# Patient Record
Sex: FEMALE | Race: WHITE | NOT HISPANIC OR LATINO | Employment: OTHER | URBAN - METROPOLITAN AREA
[De-identification: names, ages, dates, MRNs, and addresses within clinical notes are randomized per-mention and may not be internally consistent; named-entity substitution may affect disease eponyms.]

---

## 2019-09-11 ENCOUNTER — OFFICE VISIT (OUTPATIENT)
Dept: FAMILY MEDICINE | Facility: CLINIC | Age: 59
End: 2019-09-11
Payer: COMMERCIAL

## 2019-09-11 VITALS
SYSTOLIC BLOOD PRESSURE: 148 MMHG | DIASTOLIC BLOOD PRESSURE: 82 MMHG | WEIGHT: 226 LBS | HEIGHT: 62 IN | BODY MASS INDEX: 41.59 KG/M2 | HEART RATE: 84 BPM

## 2019-09-11 DIAGNOSIS — M54.12 CERVICAL RADICULOPATHY: Primary | ICD-10-CM

## 2019-09-11 DIAGNOSIS — E66.9 OBESITY (BMI 30-39.9): ICD-10-CM

## 2019-09-11 PROCEDURE — 99214 PR OFFICE/OUTPT VISIT, EST, LEVL IV, 30-39 MIN: ICD-10-PCS | Mod: S$GLB,,, | Performed by: PHYSICIAN ASSISTANT

## 2019-09-11 PROCEDURE — 99214 OFFICE O/P EST MOD 30 MIN: CPT | Mod: S$GLB,,, | Performed by: PHYSICIAN ASSISTANT

## 2019-09-11 RX ORDER — GABAPENTIN 100 MG/1
100 CAPSULE ORAL NIGHTLY
Qty: 30 CAPSULE | Refills: 5 | Status: SHIPPED | OUTPATIENT
Start: 2019-09-11 | End: 2022-06-13

## 2019-09-11 RX ORDER — FUROSEMIDE 20 MG/1
TABLET ORAL
Refills: 11 | COMMUNITY
Start: 2019-08-15 | End: 2021-01-11 | Stop reason: SDUPTHER

## 2019-09-11 RX ORDER — PHENTERMINE HYDROCHLORIDE 37.5 MG/1
37.5 TABLET ORAL
Qty: 30 TABLET | Refills: 1 | Status: SHIPPED | OUTPATIENT
Start: 2019-09-11 | End: 2019-11-01 | Stop reason: SDUPTHER

## 2019-09-11 NOTE — PROGRESS NOTES
SUBJECTIVE:    Patient ID: Noah Cast is a 59 y.o. female.    Chief Complaint: Follow-up    60 yo wf presents for checkup. She reports that she is doing about the same. No further nose bleeds since I saw her last. Still with some numbness and tingling in the bilateral hands and fingers. Occasionally wakes up from sleep. Has been told that she has a bad neck. Chiropractor attempted some adjustments that helped some, but got to be pretty time consuming. Denies ever having any neck imaging.She is also interested in starting another phentermine trial.      No visits with results within 6 Month(s) from this visit.   Latest known visit with results is:   No results found for any previous visit.       History reviewed. No pertinent past medical history.  Past Surgical History:   Procedure Laterality Date     SECTION       History reviewed. No pertinent family history.    Marital Status:   Alcohol History:  reports that she does not drink alcohol.  Tobacco History:  reports that she has never smoked. She has never used smokeless tobacco.  Drug History:  reports that she does not use drugs.    Review of patient's allergies indicates:  No Known Allergies    Current Outpatient Medications:     furosemide (LASIX) 20 MG tablet, TK 1 T PO QD WITH POTASSIUM  RICH FOOD PRN, Disp: , Rfl: 11    gabapentin (NEURONTIN) 100 MG capsule, Take 1 capsule (100 mg total) by mouth every evening., Disp: 30 capsule, Rfl: 5    phentermine (ADIPEX-P) 37.5 mg tablet, Take 1 tablet (37.5 mg total) by mouth before breakfast., Disp: 30 tablet, Rfl: 1    Review of Systems   Constitutional: Negative for appetite change, chills, fatigue, fever and unexpected weight change.   HENT: Negative for congestion.    Respiratory: Negative for cough, chest tightness and shortness of breath.    Cardiovascular: Negative for chest pain and palpitations.   Gastrointestinal: Negative for abdominal distention and abdominal pain.  "  Endocrine: Negative for cold intolerance and heat intolerance.   Genitourinary: Negative for difficulty urinating and dysuria.   Musculoskeletal: Positive for arthralgias and neck stiffness. Negative for back pain.   Neurological: Negative for dizziness, weakness and headaches.          Objective:      Vitals:    09/11/19 1409   BP: (!) 148/82   Pulse: 84   Weight: 102.5 kg (226 lb)   Height: 5' 2" (1.575 m)     Physical Exam   Constitutional: She is oriented to person, place, and time. She appears well-developed and well-nourished. No distress.   HENT:   Head: Normocephalic and atraumatic.   Eyes: Pupils are equal, round, and reactive to light. Conjunctivae and EOM are normal.   Neck: Neck supple. Muscular tenderness (trap) present. Decreased range of motion present. No thyromegaly present.   Cardiovascular: Normal rate, regular rhythm, normal heart sounds and intact distal pulses.   Pulmonary/Chest: Effort normal and breath sounds normal.   Abdominal: Soft. Bowel sounds are normal. She exhibits no distension. There is no tenderness.   Neurological: She is alert and oriented to person, place, and time. No cranial nerve deficit.   Skin: Skin is warm and dry. No erythema.   Psychiatric: She has a normal mood and affect.         Assessment:       1. Cervical radiculopathy    2. Obesity (BMI 30-39.9)         Plan:       Cervical radiculopathy  Comments:  checking xray now. trial with gabapentin at night.  Orders:  -     X-Ray Cervical Spine AP And Lateral; Future  -     gabapentin (NEURONTIN) 100 MG capsule; Take 1 capsule (100 mg total) by mouth every evening.  Dispense: 30 capsule; Refill: 5    Obesity (BMI 30-39.9)  Comments:  start back on phentermine. She will followup in 2 mos.  Orders:  -     phentermine (ADIPEX-P) 37.5 mg tablet; Take 1 tablet (37.5 mg total) by mouth before breakfast.  Dispense: 30 tablet; Refill: 1      Follow up in about 2 months (around 11/11/2019).        9/11/2019 Leonel Nino, " SANTI

## 2019-09-11 NOTE — PATIENT INSTRUCTIONS
Understanding Cervical Radiculopathy    Cervical radiculopathy is irritation or inflammation of a nerve root in the neck. It causes neck pain and other symptoms that may spread into the chest or down the arm. To understand this condition, it helps to understand the parts of the spine:  · Vertebrae. These are bones that stack to form the spine. The cervical spine contains the 7 vertebrae in the neck.  · Disks. These are soft pads of tissue between the vertebrae. They act as shock absorbers for the spine.  · The spinal canal. This is a tunnel formed within the stacked vertebrae. The spinal cord runs through this canal.  · Nerves. These branch off the spinal cord. As they leave the spinal canal, nerves pass through openings between the vertebrae. The nerve root is the part of the nerve that is closest to the spinal cord.   With cervical radiculopathy, nerve roots in the neck become irritated. This leads to pain and symptoms that can travel to the nerves that go from the spinal cord down the arms and into the torso.  What causes cervical radiculopathy?  Aging, injury, poor posture, and other issues can lead to problems in the neck. These problems may then irritate nerve roots. These include:  · Damage to a disk in the cervical spine. The damaged disk may then press on nearby nerve roots.  · Degeneration from wear and tear, and aging. This can lead to narrowing (stenosis) of the openings between the vertebrae. The narrowed openings press on nerve roots as they leave the spinal canal.  · An unstable spine. This is when a vertebra slips forward. It can then press on a nerve root.  There are other, less common causes of pressure on nerves in the neck. These include infection, cysts, and tumors.  Symptoms of cervical radiculopathy  These include:  · Neck pain  · Pain, numbness, tingling, or weakness that travels down the arm  · Loss of neck movement  · Muscle spasms  Treatment for cervical radiculopathy  In most cases,  your healthcare provider will first try treatments that help relieve symptoms. These may include:  · Prescription or over-the-counter pain medicines. These help relieve pain and swelling.  · Cold packs. These help reduce pain.  · Resting. This involves avoiding positions and activities that increase pain.  · Neck brace (cervical collar). This can help relieve inflammation and pain.  · Physical therapy, including exercises and stretches. This can help decrease pain and increase movement and function.  · Shots of medicinesaround the nerve roots. This is done to help relieve symptoms for a time.  In some cases, your healthcare provider may advise surgery to fix the underlying problem. This depends on the cause, the symptoms, and how long the pain has lasted.  Possible complications  Over time, an irritated and inflamed nerve may become damaged. This may lead to long-lasting (permanent) numbness or weakness. If symptoms change suddenly or get worse, be sure to let your healthcare provider know.     When to call your healthcare provider  Call your healthcare provider right away if you have any of these:  · New pain or pain that gets worse  · New or increasing weakness, numbness, or tingling in your arm or hand  · Bowel or bladder changes   Date Last Reviewed: 3/10/2016  © 0173-5134 [employer]. [address] [place] [address], [place] [address]. All rights reserved. This information is not intended as a substitute for professional medical care. Always follow your healthcare professional's instructions.

## 2019-09-13 ENCOUNTER — HOSPITAL ENCOUNTER (OUTPATIENT)
Dept: RADIOLOGY | Facility: HOSPITAL | Age: 59
Discharge: HOME | End: 2019-09-13
Attending: PHYSICIAN ASSISTANT
Payer: COMMERCIAL

## 2019-09-13 DIAGNOSIS — M54.12 CERVICAL RADICULOPATHY: ICD-10-CM

## 2019-09-13 PROCEDURE — 72040 X-RAY EXAM NECK SPINE 2-3 VW: CPT | Mod: TC,PO

## 2019-09-16 ENCOUNTER — TELEPHONE (OUTPATIENT)
Dept: FAMILY MEDICINE | Facility: CLINIC | Age: 59
End: 2019-09-16

## 2019-09-16 NOTE — TELEPHONE ENCOUNTER
Spoke to patient that Per Rich:  She has moderate cervical degenerative disc disease. I would refer her for PT with [facility] if she will agree to this. She said she will get back to our office if she wants to try physical therapy.

## 2019-09-16 NOTE — TELEPHONE ENCOUNTER
----- Message from Leonel Nino PA-C sent at 9/16/2019 11:26 AM CDT -----  She has moderate cervical degenerative disc disease. I would refer her for PT with [facility] if she will agree to this.

## 2019-11-01 ENCOUNTER — OFFICE VISIT (OUTPATIENT)
Dept: FAMILY MEDICINE | Facility: CLINIC | Age: 59
End: 2019-11-01
Payer: COMMERCIAL

## 2019-11-01 VITALS
HEART RATE: 80 BPM | SYSTOLIC BLOOD PRESSURE: 124 MMHG | WEIGHT: 212 LBS | BODY MASS INDEX: 39.01 KG/M2 | HEIGHT: 62 IN | TEMPERATURE: 98 F | DIASTOLIC BLOOD PRESSURE: 84 MMHG

## 2019-11-01 DIAGNOSIS — J06.9 UPPER RESPIRATORY TRACT INFECTION, UNSPECIFIED TYPE: ICD-10-CM

## 2019-11-01 DIAGNOSIS — E66.9 OBESITY (BMI 30-39.9): ICD-10-CM

## 2019-11-01 DIAGNOSIS — E66.9 OBESITY (BMI 30-39.9): Primary | ICD-10-CM

## 2019-11-01 DIAGNOSIS — B35.1 TOENAIL FUNGUS: ICD-10-CM

## 2019-11-01 PROCEDURE — 3008F PR BODY MASS INDEX (BMI) DOCUMENTED: ICD-10-PCS | Mod: S$GLB,,, | Performed by: PHYSICIAN ASSISTANT

## 2019-11-01 PROCEDURE — 96372 PR INJECTION,THERAP/PROPH/DIAG2ST, IM OR SUBCUT: ICD-10-PCS | Mod: S$GLB,,, | Performed by: PHYSICIAN ASSISTANT

## 2019-11-01 PROCEDURE — 99214 PR OFFICE/OUTPT VISIT, EST, LEVL IV, 30-39 MIN: ICD-10-PCS | Mod: 25,S$GLB,, | Performed by: PHYSICIAN ASSISTANT

## 2019-11-01 PROCEDURE — 96372 THER/PROPH/DIAG INJ SC/IM: CPT | Mod: S$GLB,,, | Performed by: PHYSICIAN ASSISTANT

## 2019-11-01 PROCEDURE — 3008F BODY MASS INDEX DOCD: CPT | Mod: S$GLB,,, | Performed by: PHYSICIAN ASSISTANT

## 2019-11-01 PROCEDURE — 99214 OFFICE O/P EST MOD 30 MIN: CPT | Mod: 25,S$GLB,, | Performed by: PHYSICIAN ASSISTANT

## 2019-11-01 RX ORDER — TERBINAFINE HYDROCHLORIDE 250 MG/1
TABLET ORAL
Refills: 2 | COMMUNITY
Start: 2019-10-23 | End: 2023-10-30

## 2019-11-01 RX ORDER — DEXAMETHASONE SODIUM PHOSPHATE 4 MG/ML
8 INJECTION, SOLUTION INTRA-ARTICULAR; INTRALESIONAL; INTRAMUSCULAR; INTRAVENOUS; SOFT TISSUE ONCE
Status: COMPLETED | OUTPATIENT
Start: 2019-11-01 | End: 2019-11-01

## 2019-11-01 RX ORDER — CYCLOSPORINE 0.5 MG/ML
EMULSION OPHTHALMIC
COMMUNITY
Start: 2019-10-08

## 2019-11-01 RX ORDER — CICLOPIROX 80 MG/ML
SOLUTION TOPICAL 2 TIMES DAILY
Qty: 1 BOTTLE | Refills: 2 | Status: SHIPPED | OUTPATIENT
Start: 2019-11-01 | End: 2023-04-04

## 2019-11-01 RX ORDER — PHENTERMINE HYDROCHLORIDE 37.5 MG/1
37.5 TABLET ORAL
Qty: 30 TABLET | Refills: 1 | Status: SHIPPED | OUTPATIENT
Start: 2019-11-01 | End: 2019-12-31

## 2019-11-01 RX ADMIN — DEXAMETHASONE SODIUM PHOSPHATE 8 MG: 4 INJECTION, SOLUTION INTRA-ARTICULAR; INTRALESIONAL; INTRAMUSCULAR; INTRAVENOUS; SOFT TISSUE at 08:11

## 2019-11-01 NOTE — PROGRESS NOTES
SUBJECTIVE:    Patient ID: Noah Cast is a 59 y.o. female.    Chief Complaint: Headache (since last // ); Sore Throat (SW); Fatigue (SW); and Cough (SW)    58 yo wm presents for regular checkup and refills. Reports that she has done really well with the diet and exercise. She is down about 15 lbs!! She also reports that she is getting over slight head cold. No fever or chills. Nyquil otc helping maybe slightly.      No visits with results within 6 Month(s) from this visit.   Latest known visit with results is:   No results found for any previous visit.       History reviewed. No pertinent past medical history.  Past Surgical History:   Procedure Laterality Date     SECTION       History reviewed. No pertinent family history.    Marital Status:   Alcohol History:  reports that she does not drink alcohol.  Tobacco History:  reports that she has never smoked. She has never used smokeless tobacco.  Drug History:  reports that she does not use drugs.    Review of patient's allergies indicates:  No Known Allergies    Current Outpatient Medications:     furosemide (LASIX) 20 MG tablet, TK 1 T PO QD WITH POTASSIUM  RICH FOOD PRN, Disp: , Rfl: 11    gabapentin (NEURONTIN) 100 MG capsule, Take 1 capsule (100 mg total) by mouth every evening., Disp: 30 capsule, Rfl: 5    phentermine (ADIPEX-P) 37.5 mg tablet, Take 1 tablet (37.5 mg total) by mouth before breakfast., Disp: 30 tablet, Rfl: 1    RESTASIS 0.05 % ophthalmic emulsion, , Disp: , Rfl:     terbinafine HCl (LAMISIL) 250 mg tablet, TK 1 T PO QD FOR 10 DAYS, Disp: , Rfl: 2    ciclopirox (PENLAC) 8 % Soln, Apply topically 2 (two) times daily., Disp: 1 Bottle, Rfl: 2    Current Facility-Administered Medications:     dexamethasone injection 8 mg, 8 mg, Intramuscular, Once, Leonel Nino PA-C    Review of Systems   Constitutional: Negative for chills, fatigue and fever.   HENT: Positive for congestion, sinus pressure and sinus  "pain. Negative for ear discharge, ear pain, rhinorrhea, sneezing, sore throat and trouble swallowing.    Eyes: Negative for pain, discharge, redness and itching.   Respiratory: Positive for cough (worse at night). Negative for chest tightness and shortness of breath.    Cardiovascular: Negative for chest pain.   Gastrointestinal: Negative for abdominal distention and abdominal pain.   Neurological: Negative for weakness and headaches.          Objective:      Vitals:    11/01/19 0742   BP: 124/84   Pulse: 80   Temp: 97.9 °F (36.6 °C)   Weight: 96.2 kg (212 lb)   Height: 5' 1.5" (1.562 m)     Physical Exam   Constitutional: She appears well-developed and well-nourished. No distress.   HENT:   Head: Normocephalic and atraumatic.   Nose: Rhinorrhea and sinus tenderness present. Right sinus exhibits maxillary sinus tenderness.   Eyes: Pupils are equal, round, and reactive to light. Conjunctivae and EOM are normal. Right eye exhibits no discharge. Left eye exhibits no discharge.   Neck: Normal range of motion. Neck supple. No thyromegaly present.   Cardiovascular: Normal rate, regular rhythm and normal heart sounds.   Pulmonary/Chest: Effort normal and breath sounds normal. No respiratory distress. She has no wheezes. She exhibits no tenderness.   Abdominal: Soft. Bowel sounds are normal.         Assessment:       1. Obesity (BMI 30-39.9)    2. Upper respiratory tract infection, unspecified type    3. Obesity (BMI 30-39.9)    4. Toenail fungus         Plan:       Obesity (BMI 30-39.9)  Comments:  Doing so well with the weight loss. will continue as is.  Orders:  -     phentermine (ADIPEX-P) 37.5 mg tablet; Take 1 tablet (37.5 mg total) by mouth before breakfast.  Dispense: 30 tablet; Refill: 1    Upper respiratory tract infection, unspecified type  Comments:  Likely viral. Will give decadron in clinic today. written rx to fill if sxs worsen.  Orders:  -     dexamethasone injection 8 mg    Obesity (BMI " 30-39.9)  Comments:  start back on phentermine. She will followup in 2 mos.  Orders:  -     phentermine (ADIPEX-P) 37.5 mg tablet; Take 1 tablet (37.5 mg total) by mouth before breakfast.  Dispense: 30 tablet; Refill: 1    Toenail fungus    Other orders  -     ciclopirox (PENLAC) 8 % Soln; Apply topically 2 (two) times daily.  Dispense: 1 Bottle; Refill: 2      No follow-ups on file.        11/1/2019 Leonel Nino PA-C

## 2019-11-01 NOTE — PATIENT INSTRUCTIONS
Nail Fungal Infection  A nail fungal infection changes the way fingernails and toenails look. They may thicken, discolor, change shape, or split. This condition is hard to treat because nails grow slowly and have limited blood supply. The infection often comes back after treatment.  There are 2 types of medicines used to treat this condition:  · Topical anti-fungal medicines. These are applied to the surface of the skin and nail area. These medicines are not very effective because they cant get deep into the nail.  · Oral antifungal medicines. These medicines work better because they go into the nail from the inside out. But the infection may still come back. It may take 9 to 12 months for your nail to look normal again. This means you are cured. You can repeat treatment if needed. Most people take these medicines without any problems. It is rare to stop therapy because of side effects. But your healthcare provider may give you some monitoring tests. Talk about possible side effects with your provider before starting treatment.  If medicines fail, the nail can be removed surgically or chemically. These methods physically remove the fungus from the body. This helps medical treatment be more effective.  Home care  · Use medicines exactly as directed for as long as directed. Treating a fungal infection can take longer than other kinds of infections.  · Smoking is a risk factor for fungal infection. This is one more reason to quit.  · Wear absorbent socks, and shoes that let your feet breathe. Sweaty feet increase your risk of fungal infection. They also make an existing infection harder to treat.  · Use footwear when in damp public places like swimming pools, gyms, and shower rooms. This will help you avoid the fungus that grows there.  · Don't share nail clippers or scissors with others.  Follow-up care  Follow up with your healthcare provider, or as advised.  When to seek medical advice  Call your healthcare  provider right away if any of these occur:  · Skin by the nail becomes red, swollen, painful, or drains pus (a creamy yellow or white liquid)  · Side effects from oral anti-fungal medicines  Date Last Reviewed: 8/1/2016  © 8999-3163 [employer]. [address] [place] [address]. All rights reserved. This information is not intended as a substitute for professional medical care. Always follow your healthcare professional's instructions.

## 2019-11-04 ENCOUNTER — TELEPHONE (OUTPATIENT)
Dept: FAMILY MEDICINE | Facility: CLINIC | Age: 59
End: 2019-11-04

## 2019-11-04 DIAGNOSIS — J06.9 UPPER RESPIRATORY TRACT INFECTION, UNSPECIFIED TYPE: Primary | ICD-10-CM

## 2019-11-04 RX ORDER — AMOXICILLIN AND CLAVULANATE POTASSIUM 875; 125 MG/1; MG/1
1 TABLET, FILM COATED ORAL EVERY 12 HOURS
Qty: 20 TABLET | Refills: 0 | Status: SHIPPED | OUTPATIENT
Start: 2019-11-04 | End: 2019-11-14

## 2019-11-04 NOTE — TELEPHONE ENCOUNTER
----- Message from Allen Morse sent at 11/4/2019 11:27 AM CST -----  Pt is still sick and christian told her he would call In something else for pt if she wasn't better   Pharm [facility]   Pt [phone]

## 2020-03-16 ENCOUNTER — TELEPHONE (OUTPATIENT)
Dept: FAMILY MEDICINE | Facility: CLINIC | Age: 60
End: 2020-03-16

## 2020-03-16 NOTE — TELEPHONE ENCOUNTER
Dr. Rodrigez is requesting lab results on this patient from the past year.  It is okay to fax to his office.

## 2020-03-16 NOTE — TELEPHONE ENCOUNTER
----- Message from Deja Calderon MA sent at 3/16/2020 10:53 AM CDT -----      ----- Message -----  From: Jennifer Mak  Sent: 3/13/2020   2:59 PM CDT  To: [facility] Staff    Referral from Dr Rodrigez 3/13/20

## 2020-09-28 ENCOUNTER — PATIENT MESSAGE (OUTPATIENT)
Dept: FAMILY MEDICINE | Facility: CLINIC | Age: 60
End: 2020-09-28

## 2020-12-14 ENCOUNTER — OFFICE VISIT (OUTPATIENT)
Dept: FAMILY MEDICINE | Facility: CLINIC | Age: 60
End: 2020-12-14
Payer: COMMERCIAL

## 2020-12-14 ENCOUNTER — TELEPHONE (OUTPATIENT)
Dept: FAMILY MEDICINE | Facility: CLINIC | Age: 60
End: 2020-12-14

## 2020-12-14 VITALS
DIASTOLIC BLOOD PRESSURE: 96 MMHG | BODY MASS INDEX: 41.22 KG/M2 | WEIGHT: 224 LBS | HEART RATE: 96 BPM | HEIGHT: 62 IN | TEMPERATURE: 99 F | SYSTOLIC BLOOD PRESSURE: 150 MMHG

## 2020-12-14 DIAGNOSIS — J32.9 SINUSITIS, UNSPECIFIED CHRONICITY, UNSPECIFIED LOCATION: ICD-10-CM

## 2020-12-14 DIAGNOSIS — H10.33 ACUTE BACTERIAL CONJUNCTIVITIS OF BOTH EYES: Primary | ICD-10-CM

## 2020-12-14 PROCEDURE — 99213 OFFICE O/P EST LOW 20 MIN: CPT | Mod: S$GLB,,, | Performed by: PHYSICIAN ASSISTANT

## 2020-12-14 PROCEDURE — 3008F PR BODY MASS INDEX (BMI) DOCUMENTED: ICD-10-PCS | Mod: S$GLB,,, | Performed by: PHYSICIAN ASSISTANT

## 2020-12-14 PROCEDURE — 99213 PR OFFICE/OUTPT VISIT, EST, LEVL III, 20-29 MIN: ICD-10-PCS | Mod: S$GLB,,, | Performed by: PHYSICIAN ASSISTANT

## 2020-12-14 PROCEDURE — 3008F BODY MASS INDEX DOCD: CPT | Mod: S$GLB,,, | Performed by: PHYSICIAN ASSISTANT

## 2020-12-14 RX ORDER — POLYMYXIN B SULFATE AND TRIMETHOPRIM 1; 10000 MG/ML; [USP'U]/ML
1 SOLUTION OPHTHALMIC EVERY 4 HOURS
Qty: 4 ML | Refills: 0 | Status: SHIPPED | OUTPATIENT
Start: 2020-12-14 | End: 2020-12-24

## 2020-12-14 RX ORDER — AMOXICILLIN AND CLAVULANATE POTASSIUM 875; 125 MG/1; MG/1
1 TABLET, FILM COATED ORAL EVERY 12 HOURS
Qty: 20 TABLET | Refills: 0 | Status: SHIPPED | OUTPATIENT
Start: 2020-12-14 | End: 2020-12-24

## 2020-12-14 RX ORDER — CODEINE PHOSPHATE AND GUAIFENESIN 10; 100 MG/5ML; MG/5ML
5 SOLUTION ORAL 3 TIMES DAILY PRN
Qty: 118 ML | Refills: 0 | Status: SHIPPED | OUTPATIENT
Start: 2020-12-14 | End: 2020-12-24

## 2020-12-14 NOTE — PATIENT INSTRUCTIONS
Conjunctivitis, Nonspecific    The membrane that covers the white part of your eye (the conjunctiva) is inflamed. Inflammation happens when your body responds to an injury, allergic reaction, infection, or illness. Symptoms of inflammation in the eye may include redness, irritation, itching, swelling, or burning. These symptoms should go away within the next 24 hours. Conjunctivitis may be related to a particle that was in your eye. If so, it may wash out with your tears or irrigation treatment. Being exposed to liquid chemicals or fumes may also cause this reaction.   Home care  · Apply a cold pack (ice in a plastic bag, wrapped in a towel) over the eye for 20 minutes at a time. This will reduce pain.  · Artificial tears may be prescribed to reduce irritation or redness.  These should be used 3 to 4 times a day.  · You may use acetaminophen or ibuprofen to control pain, unless another medicine was prescribed.(Note: If you have chronic liver or kidney disease, or if you have ever had a stomach ulcer or gastrointestinal bleeding, talk with your healthcare provider before using these medicines.)  Follow-up care  Follow up with your healthcare provider, or as advised.  When to seek medical advice  Call your healthcare provider right away if any of these occur:  · Increased eyelid swelling  · Increased eye pain  · Increased redness or drainage from the eye  · Increased blurry vision or increased sensitivity to light  · Failure of normal vision to return within 24 to 48 hours  Date Last Reviewed: 6/14/2015  © 0354-6634 [employer]. [address] [place] [address], [place] [address]. All rights reserved. This information is not intended as a substitute for professional medical care. Always follow your healthcare professional's instructions.

## 2020-12-14 NOTE — PROGRESS NOTES
SUBJECTIVE:    Patient ID: Noah Cast is a 60 y.o. female.    Chief Complaint: Cough (green mucus, eye drainage, sore throat since last Wednesday, no bottles// SW)    This is a 60-year-old female who presents today for urgent visit.  She has had a 7-8 day history of upper respiratory symptoms.  Pressure and drainage from the sinuses as well as a light cough.  She did keep her grandson last weekend and her symptoms started shortly after this.  She wakes up in the morning with both eyes very irritated and draining a thick purulence matter.  Her eyes are irritated throughout the day.  She denies any fever or chills, shortness of breath or GI symptoms she does not have any loss of smell or taste.  She was tested on Friday COVID-19 and was found to be negative.  She thought she was improving over the weekend but then things seemed to have gotten worse.      No visits with results within 6 Month(s) from this visit.   Latest known visit with results is:   No results found for any previous visit.       History reviewed. No pertinent past medical history.  Past Surgical History:   Procedure Laterality Date     SECTION       History reviewed. No pertinent family history.    Marital Status:   Alcohol History:  reports no history of alcohol use.  Tobacco History:  reports that she has never smoked. She has never used smokeless tobacco.  Drug History:  reports no history of drug use.    Review of patient's allergies indicates:  No Known Allergies    Current Outpatient Medications:     amoxicillin-clavulanate 875-125mg (AUGMENTIN) 875-125 mg per tablet, Take 1 tablet by mouth every 12 (twelve) hours. for 10 days, Disp: 20 tablet, Rfl: 0    ciclopirox (PENLAC) 8 % Soln, Apply topically 2 (two) times daily., Disp: 1 Bottle, Rfl: 2    furosemide (LASIX) 20 MG tablet, TK 1 T PO QD WITH POTASSIUM  RICH FOOD PRN, Disp: , Rfl: 11    gabapentin (NEURONTIN) 100 MG capsule, Take 1 capsule (100 mg total) by  "mouth every evening., Disp: 30 capsule, Rfl: 5    guaifenesin-codeine 100-10 mg/5 ml (TUSSI-ORGANIDIN NR)  mg/5 mL syrup, Take 5 mLs by mouth 3 (three) times daily as needed., Disp: 118 mL, Rfl: 0    polymyxin B sulf-trimethoprim (POLYTRIM) 10,000 unit- 1 mg/mL Drop, Place 1 drop into both eyes every 4 (four) hours. for 10 days, Disp: 4 mL, Rfl: 0    RESTASIS 0.05 % ophthalmic emulsion, , Disp: , Rfl:     terbinafine HCl (LAMISIL) 250 mg tablet, TK 1 T PO QD FOR 10 DAYS, Disp: , Rfl: 2    Review of Systems   Constitutional: Negative for chills, fatigue and fever.   HENT: Positive for sore throat. Negative for congestion, ear discharge, ear pain, rhinorrhea, sinus pressure, sinus pain, sneezing and trouble swallowing.    Eyes: Positive for pain, discharge and redness. Negative for itching.   Respiratory: Positive for cough. Negative for chest tightness and shortness of breath.    Cardiovascular: Negative for chest pain.   Gastrointestinal: Negative for abdominal distention and abdominal pain.   Neurological: Negative for weakness and headaches.          Objective:      Vitals:    12/14/20 1335 12/14/20 1336   BP: (!) 148/98 (!) 150/96   Pulse: 96    Temp: 99.1 °F (37.3 °C)    Weight: 101.6 kg (224 lb)    Height: 5' 1.5" (1.562 m)      Physical Exam  Constitutional:       General: She is not in acute distress.     Appearance: She is well-developed.   HENT:      Head: Normocephalic and atraumatic.      Nose:      Right Sinus: Maxillary sinus tenderness and frontal sinus tenderness present.      Left Sinus: Maxillary sinus tenderness and frontal sinus tenderness present.   Eyes:      General:         Right eye: Discharge present.         Left eye: Discharge present.     Conjunctiva/sclera:      Right eye: Right conjunctiva is injected. Exudate present.      Left eye: Left conjunctiva is injected. Exudate present.      Pupils: Pupils are equal, round, and reactive to light.   Neck:      Musculoskeletal: Normal " range of motion and neck supple.      Thyroid: No thyromegaly.   Cardiovascular:      Rate and Rhythm: Normal rate and regular rhythm.      Heart sounds: Normal heart sounds.   Pulmonary:      Effort: Pulmonary effort is normal.      Breath sounds: Normal breath sounds.   Abdominal:      General: Bowel sounds are normal. There is no distension.      Palpations: Abdomen is soft.      Tenderness: There is no abdominal tenderness.   Musculoskeletal: Normal range of motion.   Skin:     General: Skin is warm and dry.      Findings: No erythema.   Neurological:      Mental Status: She is alert and oriented to person, place, and time.      Cranial Nerves: No cranial nerve deficit.           Assessment:       1. Acute bacterial conjunctivitis of both eyes    2. Sinusitis, unspecified chronicity, unspecified location         Plan:       Acute bacterial conjunctivitis of both eyes  Comments:  Will treat with drops to be applied 3-4 times daily.  If symptoms worsen or persist please let me know  Orders:  -     polymyxin B sulf-trimethoprim (POLYTRIM) 10,000 unit- 1 mg/mL Drop; Place 1 drop into both eyes every 4 (four) hours. for 10 days  Dispense: 4 mL; Refill: 0    Sinusitis, unspecified chronicity, unspecified location  Comments:  Add Augmentin today, Cheratussin for the cough.  If symptoms worsen or persist over the next week let me know  Orders:  -     amoxicillin-clavulanate 875-125mg (AUGMENTIN) 875-125 mg per tablet; Take 1 tablet by mouth every 12 (twelve) hours. for 10 days  Dispense: 20 tablet; Refill: 0  -     guaifenesin-codeine 100-10 mg/5 ml (TUSSI-ORGANIDIN NR)  mg/5 mL syrup; Take 5 mLs by mouth 3 (three) times daily as needed.  Dispense: 118 mL; Refill: 0      Follow up if symptoms worsen or fail to improve.        12/14/2020 Leonel Nino PA-C

## 2020-12-14 NOTE — TELEPHONE ENCOUNTER
----- Message from Christina Mayorga sent at 12/14/2020 10:31 AM CST -----  Pt coming in today at 1:40 for nasal congestion, sore throat, weeping eyes she stated she was tested for covid on Friday and was negative.  Pt aware to use Curbside check in.

## 2020-12-17 ENCOUNTER — PATIENT MESSAGE (OUTPATIENT)
Dept: FAMILY MEDICINE | Facility: CLINIC | Age: 60
End: 2020-12-17

## 2020-12-17 RX ORDER — CODEINE PHOSPHATE AND GUAIFENESIN 10; 100 MG/5ML; MG/5ML
5 SOLUTION ORAL 3 TIMES DAILY PRN
Qty: 118 ML | Refills: 0 | Status: SHIPPED | OUTPATIENT
Start: 2020-12-17 | End: 2020-12-27

## 2021-03-11 ENCOUNTER — IMMUNIZATION (OUTPATIENT)
Dept: PRIMARY CARE CLINIC | Facility: CLINIC | Age: 61
End: 2021-03-11
Payer: COMMERCIAL

## 2021-03-11 DIAGNOSIS — Z23 NEED FOR VACCINATION: Primary | ICD-10-CM

## 2021-03-11 PROCEDURE — 91300 COVID-19, MRNA, LNP-S, PF, 30 MCG/0.3 ML DOSE VACCINE: CPT | Mod: S$GLB,,, | Performed by: FAMILY MEDICINE

## 2021-03-11 PROCEDURE — 0001A COVID-19, MRNA, LNP-S, PF, 30 MCG/0.3 ML DOSE VACCINE: CPT | Mod: CV19,S$GLB,, | Performed by: FAMILY MEDICINE

## 2021-03-11 PROCEDURE — 0001A COVID-19, MRNA, LNP-S, PF, 30 MCG/0.3 ML DOSE VACCINE: ICD-10-PCS | Mod: CV19,S$GLB,, | Performed by: FAMILY MEDICINE

## 2021-03-11 PROCEDURE — 91300 COVID-19, MRNA, LNP-S, PF, 30 MCG/0.3 ML DOSE VACCINE: ICD-10-PCS | Mod: S$GLB,,, | Performed by: FAMILY MEDICINE

## 2021-04-01 ENCOUNTER — IMMUNIZATION (OUTPATIENT)
Dept: PRIMARY CARE CLINIC | Facility: CLINIC | Age: 61
End: 2021-04-01
Payer: COMMERCIAL

## 2021-04-01 DIAGNOSIS — Z23 NEED FOR VACCINATION: Primary | ICD-10-CM

## 2021-04-01 PROCEDURE — 91300 COVID-19, MRNA, LNP-S, PF, 30 MCG/0.3 ML DOSE VACCINE: ICD-10-PCS | Mod: S$GLB,,, | Performed by: FAMILY MEDICINE

## 2021-04-01 PROCEDURE — 0002A COVID-19, MRNA, LNP-S, PF, 30 MCG/0.3 ML DOSE VACCINE: CPT | Mod: CV19,S$GLB,, | Performed by: FAMILY MEDICINE

## 2021-04-01 PROCEDURE — 91300 COVID-19, MRNA, LNP-S, PF, 30 MCG/0.3 ML DOSE VACCINE: CPT | Mod: S$GLB,,, | Performed by: FAMILY MEDICINE

## 2021-04-01 PROCEDURE — 0002A COVID-19, MRNA, LNP-S, PF, 30 MCG/0.3 ML DOSE VACCINE: ICD-10-PCS | Mod: CV19,S$GLB,, | Performed by: FAMILY MEDICINE

## 2021-08-25 DIAGNOSIS — Z12.31 ENCOUNTER FOR SCREENING MAMMOGRAM FOR MALIGNANT NEOPLASM OF BREAST: Primary | ICD-10-CM

## 2021-08-27 ENCOUNTER — HOSPITAL ENCOUNTER (OUTPATIENT)
Dept: RADIOLOGY | Facility: HOSPITAL | Age: 61
Discharge: HOME | End: 2021-08-27
Attending: SPECIALIST
Payer: COMMERCIAL

## 2021-08-27 DIAGNOSIS — Z12.31 ENCOUNTER FOR SCREENING MAMMOGRAM FOR MALIGNANT NEOPLASM OF BREAST: ICD-10-CM

## 2021-08-27 PROCEDURE — 77067 SCR MAMMO BI INCL CAD: CPT | Mod: TC,PO

## 2021-11-04 ENCOUNTER — TELEPHONE (OUTPATIENT)
Dept: FAMILY MEDICINE | Facility: CLINIC | Age: 61
End: 2021-11-04
Payer: COMMERCIAL

## 2021-12-10 ENCOUNTER — OFFICE VISIT (OUTPATIENT)
Dept: FAMILY MEDICINE | Facility: CLINIC | Age: 61
End: 2021-12-10
Payer: COMMERCIAL

## 2021-12-10 VITALS
HEART RATE: 100 BPM | SYSTOLIC BLOOD PRESSURE: 136 MMHG | WEIGHT: 214 LBS | DIASTOLIC BLOOD PRESSURE: 82 MMHG | BODY MASS INDEX: 39.38 KG/M2 | HEIGHT: 62 IN

## 2021-12-10 DIAGNOSIS — M19.90 ARTHRITIS: ICD-10-CM

## 2021-12-10 DIAGNOSIS — R60.0 EDEMA OF BOTH LEGS: ICD-10-CM

## 2021-12-10 DIAGNOSIS — Z79.899 ENCOUNTER FOR LONG-TERM (CURRENT) USE OF OTHER MEDICATIONS: ICD-10-CM

## 2021-12-10 DIAGNOSIS — R21 LOCALIZED RASH: ICD-10-CM

## 2021-12-10 DIAGNOSIS — R01.1 HEART MURMUR: ICD-10-CM

## 2021-12-10 DIAGNOSIS — M47.9 ARTHRITIS OF BACK: Primary | ICD-10-CM

## 2021-12-10 PROCEDURE — 99214 PR OFFICE/OUTPT VISIT, EST, LEVL IV, 30-39 MIN: ICD-10-PCS | Mod: S$GLB,,, | Performed by: PHYSICIAN ASSISTANT

## 2021-12-10 PROCEDURE — 99214 OFFICE O/P EST MOD 30 MIN: CPT | Mod: S$GLB,,, | Performed by: PHYSICIAN ASSISTANT

## 2021-12-10 RX ORDER — BUTALBITAL, ACETAMINOPHEN AND CAFFEINE 50; 325; 40 MG/1; MG/1; MG/1
1 TABLET ORAL 2 TIMES DAILY PRN
COMMUNITY

## 2021-12-10 RX ORDER — TRIAMCINOLONE ACETONIDE 1 MG/G
CREAM TOPICAL 2 TIMES DAILY
Qty: 45 G | Refills: 1 | Status: SHIPPED | OUTPATIENT
Start: 2021-12-10 | End: 2022-12-13 | Stop reason: SDUPTHER

## 2021-12-10 RX ORDER — FUROSEMIDE 20 MG/1
TABLET ORAL
Qty: 90 TABLET | Refills: 1 | Status: SHIPPED | OUTPATIENT
Start: 2021-12-10 | End: 2022-06-13 | Stop reason: SDUPTHER

## 2021-12-10 RX ORDER — DICLOFENAC SODIUM 25 MG/1
25 TABLET, DELAYED RELEASE ORAL DAILY
Qty: 30 TABLET | Refills: 2 | Status: SHIPPED | OUTPATIENT
Start: 2021-12-10 | End: 2022-03-10

## 2021-12-14 ENCOUNTER — TELEPHONE (OUTPATIENT)
Dept: FAMILY MEDICINE | Facility: CLINIC | Age: 61
End: 2021-12-14
Payer: COMMERCIAL

## 2021-12-14 LAB
ALBUMIN SERPL-MCNC: 4.2 G/DL (ref 3.6–5.1)
ALBUMIN/GLOB SERPL: 1.8 (CALC) (ref 1–2.5)
ALP SERPL-CCNC: 93 U/L (ref 37–153)
ALT SERPL-CCNC: 109 U/L (ref 6–29)
ANA PAT SER IF-IMP: ABNORMAL
ANA SER QL IF: POSITIVE
ANA TITR SER IF: ABNORMAL TITER
AST SERPL-CCNC: 66 U/L (ref 10–35)
BASOPHILS # BLD AUTO: 9 CELLS/UL (ref 0–200)
BASOPHILS NFR BLD AUTO: 0.3 %
BILIRUB SERPL-MCNC: 1.2 MG/DL (ref 0.2–1.2)
BUN SERPL-MCNC: 19 MG/DL (ref 7–25)
BUN/CREAT SERPL: ABNORMAL (CALC) (ref 6–22)
CALCIUM SERPL-MCNC: 9.9 MG/DL (ref 8.6–10.4)
CHLORIDE SERPL-SCNC: 106 MMOL/L (ref 98–110)
CHOLEST SERPL-MCNC: 145 MG/DL
CHOLEST/HDLC SERPL: 3.8 (CALC)
CO2 SERPL-SCNC: 25 MMOL/L (ref 20–32)
CREAT SERPL-MCNC: 0.54 MG/DL (ref 0.5–0.99)
EOSINOPHIL # BLD AUTO: 72 CELLS/UL (ref 15–500)
EOSINOPHIL NFR BLD AUTO: 2.4 %
ERYTHROCYTE [DISTWIDTH] IN BLOOD BY AUTOMATED COUNT: 14.5 % (ref 11–15)
ERYTHROCYTE [SEDIMENTATION RATE] IN BLOOD BY WESTERGREN METHOD: 14 MM/H
GLOBULIN SER CALC-MCNC: 2.4 G/DL (CALC) (ref 1.9–3.7)
GLUCOSE SERPL-MCNC: 112 MG/DL (ref 65–99)
HCT VFR BLD AUTO: 36.1 % (ref 35–45)
HDLC SERPL-MCNC: 38 MG/DL
HGB BLD-MCNC: 10.8 G/DL (ref 11.7–15.5)
LDLC SERPL CALC-MCNC: 85 MG/DL (CALC)
LYMPHOCYTES # BLD AUTO: 723 CELLS/UL (ref 850–3900)
LYMPHOCYTES NFR BLD AUTO: 24.1 %
MCH RBC QN AUTO: 18.9 PG (ref 27–33)
MCHC RBC AUTO-ENTMCNC: 29.9 G/DL (ref 32–36)
MCV RBC AUTO: 63.1 FL (ref 80–100)
MONOCYTES # BLD AUTO: 357 CELLS/UL (ref 200–950)
MONOCYTES NFR BLD AUTO: 11.9 %
MORPHOLOGY BLD-IMP: ABNORMAL
NEUTROPHILS # BLD AUTO: 1839 CELLS/UL (ref 1500–7800)
NEUTROPHILS NFR BLD AUTO: 61.3 %
NONHDLC SERPL-MCNC: 107 MG/DL (CALC)
PLATELET # BLD AUTO: 256 THOUSAND/UL (ref 140–400)
PMV BLD REES-ECKER: 9.1 FL (ref 7.5–12.5)
POTASSIUM SERPL-SCNC: 4.4 MMOL/L (ref 3.5–5.3)
PROT SERPL-MCNC: 6.6 G/DL (ref 6.1–8.1)
RBC # BLD AUTO: 5.72 MILLION/UL (ref 3.8–5.1)
RHEUMATOID FACT SERPL-ACNC: <14 IU/ML
SODIUM SERPL-SCNC: 139 MMOL/L (ref 135–146)
T4 FREE SERPL-MCNC: 0.6 NG/DL (ref 0.8–1.8)
TRIGL SERPL-MCNC: 128 MG/DL
TSH SERPL-ACNC: <0.01 MIU/L (ref 0.4–4.5)
WBC # BLD AUTO: 3 THOUSAND/UL (ref 3.8–10.8)

## 2021-12-30 ENCOUNTER — IMMUNIZATION (OUTPATIENT)
Dept: PRIMARY CARE CLINIC | Facility: CLINIC | Age: 61
End: 2021-12-30
Payer: COMMERCIAL

## 2021-12-30 DIAGNOSIS — Z23 NEED FOR VACCINATION: Primary | ICD-10-CM

## 2021-12-30 PROCEDURE — 0004A COVID-19, MRNA, LNP-S, PF, 30 MCG/0.3 ML DOSE VACCINE: CPT | Mod: S$GLB,,, | Performed by: FAMILY MEDICINE

## 2021-12-30 PROCEDURE — 91300 COVID-19, MRNA, LNP-S, PF, 30 MCG/0.3 ML DOSE VACCINE: ICD-10-PCS | Mod: S$GLB,,, | Performed by: FAMILY MEDICINE

## 2021-12-30 PROCEDURE — 0004A COVID-19, MRNA, LNP-S, PF, 30 MCG/0.3 ML DOSE VACCINE: ICD-10-PCS | Mod: S$GLB,,, | Performed by: FAMILY MEDICINE

## 2021-12-30 PROCEDURE — 91300 COVID-19, MRNA, LNP-S, PF, 30 MCG/0.3 ML DOSE VACCINE: CPT | Mod: S$GLB,,, | Performed by: FAMILY MEDICINE

## 2022-01-03 ENCOUNTER — TELEPHONE (OUTPATIENT)
Dept: FAMILY MEDICINE | Facility: CLINIC | Age: 62
End: 2022-01-03
Payer: COMMERCIAL

## 2022-01-03 DIAGNOSIS — E23.6 HYPOTHALAMIC DYSFUNCTION: Primary | ICD-10-CM

## 2022-01-03 DIAGNOSIS — E03.9 HYPOTHYROIDISM, UNSPECIFIED TYPE: ICD-10-CM

## 2022-01-03 NOTE — TELEPHONE ENCOUNTER
----- Message from Leonel Nino PA-C sent at 1/2/2022  8:59 PM CST -----  A few things with recent labs. Her thryoid function is underactive, but not in a typical way. 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 Liver enzymes slightly elevated, and BARB is posistive. I would start with endocrine here to discuss the thyroid and we will address the other abnormalities after. [facility] and he could probably see her fairly quick.

## 2022-01-17 ENCOUNTER — TELEPHONE (OUTPATIENT)
Dept: FAMILY MEDICINE | Facility: CLINIC | Age: 62
End: 2022-01-17
Payer: COMMERCIAL

## 2022-01-17 NOTE — TELEPHONE ENCOUNTER
----- Message from Sarah Wilcox sent at 1/17/2022  9:16 AM CST -----  Regarding: Unable to Contact - ###  We have made several attempts to contact this patient to schedule their Echo and have been unable to reach them. We will be removing this order from our work queue but wanted to make you aware in case you wanted to reach out to the patient.     Thanks,   [facility] Centralized Scheduling

## 2022-06-13 ENCOUNTER — OFFICE VISIT (OUTPATIENT)
Dept: FAMILY MEDICINE | Facility: CLINIC | Age: 62
End: 2022-06-13
Payer: COMMERCIAL

## 2022-06-13 VITALS
HEART RATE: 82 BPM | HEIGHT: 62 IN | OXYGEN SATURATION: 97 % | BODY MASS INDEX: 41.41 KG/M2 | SYSTOLIC BLOOD PRESSURE: 136 MMHG | DIASTOLIC BLOOD PRESSURE: 80 MMHG | WEIGHT: 225 LBS

## 2022-06-13 DIAGNOSIS — R01.1 SYSTOLIC MURMUR: ICD-10-CM

## 2022-06-13 DIAGNOSIS — Z00.00 ROUTINE PHYSICAL EXAMINATION: ICD-10-CM

## 2022-06-13 DIAGNOSIS — R60.0 EDEMA OF BOTH LEGS: ICD-10-CM

## 2022-06-13 DIAGNOSIS — Z12.11 SCREEN FOR COLON CANCER: ICD-10-CM

## 2022-06-13 DIAGNOSIS — E03.9 HYPOTHYROIDISM, UNSPECIFIED TYPE: Primary | ICD-10-CM

## 2022-06-13 PROCEDURE — 99396 PREV VISIT EST AGE 40-64: CPT | Mod: S$GLB,,, | Performed by: PHYSICIAN ASSISTANT

## 2022-06-13 PROCEDURE — 3079F DIAST BP 80-89 MM HG: CPT | Mod: CPTII,S$GLB,, | Performed by: PHYSICIAN ASSISTANT

## 2022-06-13 PROCEDURE — 3079F PR MOST RECENT DIASTOLIC BLOOD PRESSURE 80-89 MM HG: ICD-10-PCS | Mod: CPTII,S$GLB,, | Performed by: PHYSICIAN ASSISTANT

## 2022-06-13 PROCEDURE — 3075F PR MOST RECENT SYSTOLIC BLOOD PRESS GE 130-139MM HG: ICD-10-PCS | Mod: CPTII,S$GLB,, | Performed by: PHYSICIAN ASSISTANT

## 2022-06-13 PROCEDURE — 1159F MED LIST DOCD IN RCRD: CPT | Mod: CPTII,S$GLB,, | Performed by: PHYSICIAN ASSISTANT

## 2022-06-13 PROCEDURE — 99396 PR PREVENTIVE VISIT,EST,40-64: ICD-10-PCS | Mod: S$GLB,,, | Performed by: PHYSICIAN ASSISTANT

## 2022-06-13 PROCEDURE — 3008F PR BODY MASS INDEX (BMI) DOCUMENTED: ICD-10-PCS | Mod: CPTII,S$GLB,, | Performed by: PHYSICIAN ASSISTANT

## 2022-06-13 PROCEDURE — 3075F SYST BP GE 130 - 139MM HG: CPT | Mod: CPTII,S$GLB,, | Performed by: PHYSICIAN ASSISTANT

## 2022-06-13 PROCEDURE — 3008F BODY MASS INDEX DOCD: CPT | Mod: CPTII,S$GLB,, | Performed by: PHYSICIAN ASSISTANT

## 2022-06-13 PROCEDURE — 1159F PR MEDICATION LIST DOCUMENTED IN MEDICAL RECORD: ICD-10-PCS | Mod: CPTII,S$GLB,, | Performed by: PHYSICIAN ASSISTANT

## 2022-06-13 RX ORDER — FUROSEMIDE 20 MG/1
TABLET ORAL
Qty: 90 TABLET | Refills: 1 | Status: SHIPPED | OUTPATIENT
Start: 2022-06-13 | End: 2022-12-13 | Stop reason: SDUPTHER

## 2022-06-13 NOTE — PROGRESS NOTES
SUBJECTIVE:    Patient ID: Noah Cast is a 61 y.o. female.    Chief Complaint: Follow-up (6 mo f/u//no med bottles//tc)    This is a 61-year-old female who presents today for regular 6 month follow-up. Reports that she has been doing well overall. She did have some abnormal labs.. TSH was low but free T4 suppressed. I had referred her for further endo evaluation of hypothalamic dysfunction. She admits that she was taking exogenous thyroid for weight loss at the time. Open to rechecking labs now. No new issues or concerns. She is going to check on coverage regarding weight loss surgery. Has tried and failed several times through the years.      No visits with results within 6 Month(s) from this visit.   Latest known visit with results is:   Office Visit on 12/10/2021   Component Date Value Ref Range Status    WBC 12/10/2021 3.0 (A) 3.8 - 10.8 Thousand/uL Final    RBC 12/10/2021 5.72 (A) 3.80 - 5.10 Million/uL Final    Hemoglobin 12/10/2021 10.8 (A) 11.7 - 15.5 g/dL Final    Hematocrit 12/10/2021 36.1  35.0 - 45.0 % Final    MCV 12/10/2021 63.1 (A) 80.0 - 100.0 fL Final    MCH 12/10/2021 18.9 (A) 27.0 - 33.0 pg Final    MCHC 12/10/2021 29.9 (A) 32.0 - 36.0 g/dL Final    RDW 12/10/2021 14.5  11.0 - 15.0 % Final    Platelets 12/10/2021 256  140 - 400 Thousand/uL Final    MPV 12/10/2021 9.1  7.5 - 12.5 fL Final    Neutrophils, Abs 12/10/2021 1,839  1,500 - 7,800 cells/uL Final    Lymph # 12/10/2021 723 (A) 850 - 3,900 cells/uL Final    Mono # 12/10/2021 357  200 - 950 cells/uL Final    Eos # 12/10/2021 72  15 - 500 cells/uL Final    Baso # 12/10/2021 9  0 - 200 cells/uL Final    Neutrophils Relative 12/10/2021 61.3  % Final    Lymph % 12/10/2021 24.1  % Final    Mono % 12/10/2021 11.9  % Final    Eosinophil % 12/10/2021 2.4  % Final    Basophil % 12/10/2021 0.3  % Final    CBC Morphology 12/10/2021   NORMAL Final    Glucose 12/10/2021 112 (A) 65 - 99 mg/dL Final    BUN 12/10/2021 19  7 -  25 mg/dL Final    Creatinine 12/10/2021 0.54  0.50 - 0.99 mg/dL Final    eGFR if non  12/10/2021 103  > OR = 60 mL/min/1.73m2 Final    eGFR if  12/10/2021 119  > OR = 60 mL/min/1.73m2 Final    BUN/Creatinine Ratio 12/10/2021 NOT APPLICABLE  6 - 22 (calc) Final    Sodium 12/10/2021 139  135 - 146 mmol/L Final    Potassium 12/10/2021 4.4  3.5 - 5.3 mmol/L Final    Chloride 12/10/2021 106  98 - 110 mmol/L Final    CO2 12/10/2021 25  20 - 32 mmol/L Final    Calcium 12/10/2021 9.9  8.6 - 10.4 mg/dL Final    Total Protein 12/10/2021 6.6  6.1 - 8.1 g/dL Final    Albumin 12/10/2021 4.2  3.6 - 5.1 g/dL Final    Globulin, Total 12/10/2021 2.4  1.9 - 3.7 g/dL (calc) Final    Albumin/Globulin Ratio 12/10/2021 1.8  1.0 - 2.5 (calc) Final    Total Bilirubin 12/10/2021 1.2  0.2 - 1.2 mg/dL Final    Alkaline Phosphatase 12/10/2021 93  37 - 153 U/L Final    AST 12/10/2021 66 (A) 10 - 35 U/L Final    ALT 12/10/2021 109 (A) 6 - 29 U/L Final    Cholesterol 12/10/2021 145  <200 mg/dL Final    HDL 12/10/2021 38 (A) > OR = 50 mg/dL Final    Triglycerides 12/10/2021 128  <150 mg/dL Final    LDL Cholesterol 12/10/2021 85  mg/dL (calc) Final    HDL/Cholesterol Ratio 12/10/2021 3.8  <5.0 (calc) Final    Non HDL Chol. (LDL+VLDL) 12/10/2021 107  <130 mg/dL (calc) Final    TSH w/reflex to FT4 12/10/2021 <0.01 (A) 0.40 - 4.50 mIU/L Final    T4, Free 12/10/2021 0.6 (A) 0.8 - 1.8 ng/dL Final    BARB 12/10/2021 POSITIVE (A) NEGATIVE Final    BARB Titer 12/10/2021 1:640 (A) titer Final    BARB Pattern 12/10/2021 Nuclear, Dense Fine Speckled (A)  Final    Rheumatoid Factor 12/10/2021 <14  <14 IU/mL Final    Sed Rate 12/10/2021 14  < OR = 30 mm/h Final       No past medical history on file.  Past Surgical History:   Procedure Laterality Date     SECTION       No family history on file.    Marital Status:   Alcohol History:  reports no history of alcohol use.  Tobacco History:   "reports that she has never smoked. She has never used smokeless tobacco.  Drug History:  reports no history of drug use.    Review of patient's allergies indicates:  No Known Allergies    Current Outpatient Medications:     butalbital-acetaminophen-caffeine -40 mg (FIORICET, ESGIC) -40 mg per tablet, Take 1 tablet by mouth 2 (two) times daily as needed., Disp: , Rfl:     ciclopirox (PENLAC) 8 % Soln, Apply topically 2 (two) times daily., Disp: 1 Bottle, Rfl: 2    furosemide (LASIX) 20 MG tablet, TK 1 T PO QD WITH POTASSIUM  RICH FOOD PRN, Disp: 90 tablet, Rfl: 1    RESTASIS 0.05 % ophthalmic emulsion, , Disp: , Rfl:     terbinafine HCl (LAMISIL) 250 mg tablet, TK 1 T PO QD FOR 10 DAYS, Disp: , Rfl: 2    triamcinolone acetonide 0.1% (KENALOG) 0.1 % cream, Apply topically 2 (two) times daily., Disp: 45 g, Rfl: 1    Review of Systems   Constitutional: Negative for diaphoresis, fatigue and unexpected weight change.   HENT: Negative for sinus pain and sore throat.    Eyes: Negative for pain, redness and visual disturbance.   Respiratory: Negative for cough, chest tightness, shortness of breath and wheezing.    Cardiovascular: Negative for chest pain and palpitations.   Gastrointestinal: Negative for abdominal pain, blood in stool, diarrhea, nausea and vomiting.   Endocrine: Negative for polydipsia, polyphagia and polyuria.   Genitourinary: Negative for dysuria, frequency and urgency.   Musculoskeletal: Positive for arthralgias (bilateral knees), back pain and neck pain. Negative for myalgias.   Skin: Negative for rash.   Allergic/Immunologic: Negative for environmental allergies.   Neurological: Negative for dizziness, syncope and headaches.   Psychiatric/Behavioral: Negative for suicidal ideas.          Objective:      Vitals:    06/13/22 1451   BP: 136/80   Pulse: 82   SpO2: 97%   Weight: 102.1 kg (225 lb)   Height: 5' 1.5" (1.562 m)     Physical Exam  Constitutional:       General: She is not in " acute distress.     Appearance: She is well-developed. She is obese.   HENT:      Head: Normocephalic and atraumatic.   Eyes:      Conjunctiva/sclera: Conjunctivae normal.      Pupils: Pupils are equal, round, and reactive to light.   Neck:      Thyroid: No thyromegaly.   Cardiovascular:      Rate and Rhythm: Normal rate and regular rhythm.      Heart sounds: Murmur (holosystolic murmur 3/6) heard.   Pulmonary:      Effort: Pulmonary effort is normal.      Breath sounds: Normal breath sounds.   Abdominal:      General: Bowel sounds are normal. There is no distension.      Palpations: Abdomen is soft.      Tenderness: There is no abdominal tenderness.   Musculoskeletal:         General: Normal range of motion.      Cervical back: Normal range of motion and neck supple.   Skin:     General: Skin is warm and dry.      Findings: No erythema.   Neurological:      Mental Status: She is alert and oriented to person, place, and time.      Cranial Nerves: No cranial nerve deficit.           Assessment:       1. Hypothyroidism, unspecified type    2. Edema of both legs    3. Routine physical examination    4. Screen for colon cancer    5. Systolic murmur         Plan:       Hypothyroidism, unspecified type  Comments:  2/2 supplemental thyroid she was getting for weight loss. Has stopped taking this now. will recheck labs asap  Orders:  -     TSH w/reflex to FT4; Future; Expected date: 06/13/2022    Edema of both legs  Comments:  Lasix PRN for edema  Orders:  -     furosemide (LASIX) 20 MG tablet; TK 1 T PO QD WITH POTASSIUM  RICH FOOD PRN  Dispense: 90 tablet; Refill: 1    Routine physical examination  Comments:  labs for ongoing pt care.   Orders:  -     CBC Auto Differential; Future; Expected date: 06/13/2022  -     Comprehensive Metabolic Panel; Future; Expected date: 06/13/2022  -     TSH w/reflex to FT4; Future; Expected date: 06/13/2022  -     Urinalysis, Reflex to Urine Culture Urine, Clean Catch  -     Lipid Panel;  Future; Expected date: 06/13/2022    Screen for colon cancer  -     Cologuard Screening (Multitarget Stool DNA); Future; Expected date: 06/13/2022    Systolic murmur  Comments:  holosystolic murmur heard on exam. Need to get echo scheduled now.  Orders:  -     Echo Saline Bubble? No; Future; Expected date: 06/13/2022      Follow up in about 6 months (around 12/13/2022).        6/13/2022 Leonel Nino PA-C

## 2022-07-26 ENCOUNTER — TELEPHONE (OUTPATIENT)
Dept: CARDIOLOGY | Facility: HOSPITAL | Age: 62
End: 2022-07-26

## 2022-07-26 ENCOUNTER — PATIENT MESSAGE (OUTPATIENT)
Dept: FAMILY MEDICINE | Facility: CLINIC | Age: 62
End: 2022-07-26

## 2022-07-27 ENCOUNTER — TELEPHONE (OUTPATIENT)
Dept: FAMILY MEDICINE | Facility: CLINIC | Age: 62
End: 2022-07-27

## 2022-07-27 NOTE — TELEPHONE ENCOUNTER
----- Message from Brittany Alas LPN sent at 7/27/2022  2:47 PM CDT -----    ----- Message -----  From: Yeni Whitehead  Sent: 7/27/2022   2:10 PM CDT  To: [facility] Staff    Patient called and stated that she has been scratching and her ankle is swollen real big she stated that she need an antibiotic called into [facility] if any questions please give her a call at [phone]

## 2022-07-28 LAB
ALBUMIN SERPL-MCNC: 4.7 G/DL (ref 3.6–5.1)
ALBUMIN/GLOB SERPL: 1.9 (CALC) (ref 1–2.5)
ALP SERPL-CCNC: 90 U/L (ref 37–153)
ALT SERPL-CCNC: 16 U/L (ref 6–29)
AST SERPL-CCNC: 14 U/L (ref 10–35)
BASOPHILS # BLD AUTO: 31 CELLS/UL (ref 0–200)
BASOPHILS NFR BLD AUTO: 0.8 %
BILIRUB SERPL-MCNC: 0.9 MG/DL (ref 0.2–1.2)
BUN SERPL-MCNC: 23 MG/DL (ref 7–25)
BUN/CREAT SERPL: ABNORMAL (CALC) (ref 6–22)
CALCIUM SERPL-MCNC: 9.1 MG/DL (ref 8.6–10.4)
CHLORIDE SERPL-SCNC: 108 MMOL/L (ref 98–110)
CHOLEST SERPL-MCNC: 195 MG/DL
CHOLEST/HDLC SERPL: 4.1 (CALC)
CO2 SERPL-SCNC: 28 MMOL/L (ref 20–32)
CREAT SERPL-MCNC: 0.92 MG/DL (ref 0.5–1.05)
EGFR: 71 ML/MIN/1.73M2
EOSINOPHIL # BLD AUTO: 148 CELLS/UL (ref 15–500)
EOSINOPHIL NFR BLD AUTO: 3.8 %
ERYTHROCYTE [DISTWIDTH] IN BLOOD BY AUTOMATED COUNT: 18.4 % (ref 11–15)
GLOBULIN SER CALC-MCNC: 2.5 G/DL (CALC) (ref 1.9–3.7)
GLUCOSE SERPL-MCNC: 107 MG/DL (ref 65–99)
HCT VFR BLD AUTO: 40.6 % (ref 35–45)
HDLC SERPL-MCNC: 48 MG/DL
HGB BLD-MCNC: 11.9 G/DL (ref 11.7–15.5)
LDLC SERPL CALC-MCNC: 125 MG/DL (CALC)
LYMPHOCYTES # BLD AUTO: 952 CELLS/UL (ref 850–3900)
LYMPHOCYTES NFR BLD AUTO: 24.4 %
MCH RBC QN AUTO: 19.1 PG (ref 27–33)
MCHC RBC AUTO-ENTMCNC: 29.3 G/DL (ref 32–36)
MCV RBC AUTO: 65.2 FL (ref 80–100)
MONOCYTES # BLD AUTO: 296 CELLS/UL (ref 200–950)
MONOCYTES NFR BLD AUTO: 7.6 %
NEUTROPHILS # BLD AUTO: 2473 CELLS/UL (ref 1500–7800)
NEUTROPHILS NFR BLD AUTO: 63.4 %
NONHDLC SERPL-MCNC: 147 MG/DL (CALC)
PLATELET # BLD AUTO: 229 THOUSAND/UL (ref 140–400)
PMV BLD REES-ECKER: 8.8 FL (ref 7.5–12.5)
POTASSIUM SERPL-SCNC: 4.8 MMOL/L (ref 3.5–5.3)
PROT SERPL-MCNC: 7.2 G/DL (ref 6.1–8.1)
RBC # BLD AUTO: 6.23 MILLION/UL (ref 3.8–5.1)
SERVICE CMNT-IMP: ABNORMAL
SODIUM SERPL-SCNC: 142 MMOL/L (ref 135–146)
TRIGL SERPL-MCNC: 114 MG/DL
TSH SERPL-ACNC: 2.18 MIU/L (ref 0.4–4.5)
WBC # BLD AUTO: 3.9 THOUSAND/UL (ref 3.8–10.8)

## 2022-07-28 NOTE — TELEPHONE ENCOUNTER
Well lets plan to start this new auth. Lets make sure that the patient gets it scheduled timely this time around

## 2022-10-07 LAB — NONINV COLON CA DNA+OCC BLD SCRN STL QL: NEGATIVE

## 2022-12-13 ENCOUNTER — OFFICE VISIT (OUTPATIENT)
Dept: FAMILY MEDICINE | Facility: CLINIC | Age: 62
End: 2022-12-13
Payer: COMMERCIAL

## 2022-12-13 VITALS
SYSTOLIC BLOOD PRESSURE: 148 MMHG | DIASTOLIC BLOOD PRESSURE: 82 MMHG | BODY MASS INDEX: 42.69 KG/M2 | HEART RATE: 76 BPM | HEIGHT: 62 IN | WEIGHT: 232 LBS

## 2022-12-13 DIAGNOSIS — R21 LOCALIZED RASH: ICD-10-CM

## 2022-12-13 DIAGNOSIS — Z12.31 ENCOUNTER FOR SCREENING MAMMOGRAM FOR MALIGNANT NEOPLASM OF BREAST: ICD-10-CM

## 2022-12-13 DIAGNOSIS — R60.0 EDEMA OF BOTH LEGS: ICD-10-CM

## 2022-12-13 DIAGNOSIS — M19.90 ARTHRITIS: ICD-10-CM

## 2022-12-13 DIAGNOSIS — F51.01 PRIMARY INSOMNIA: Primary | ICD-10-CM

## 2022-12-13 PROCEDURE — 1159F PR MEDICATION LIST DOCUMENTED IN MEDICAL RECORD: ICD-10-PCS | Mod: CPTII,S$GLB,, | Performed by: PHYSICIAN ASSISTANT

## 2022-12-13 PROCEDURE — 3008F BODY MASS INDEX DOCD: CPT | Mod: CPTII,S$GLB,, | Performed by: PHYSICIAN ASSISTANT

## 2022-12-13 PROCEDURE — 90471 FLU VACCINE - QUADRIVALENT (RECOMBINANT) PRESERVATIVE FREE: ICD-10-PCS | Mod: S$GLB,,, | Performed by: PHYSICIAN ASSISTANT

## 2022-12-13 PROCEDURE — 1159F MED LIST DOCD IN RCRD: CPT | Mod: CPTII,S$GLB,, | Performed by: PHYSICIAN ASSISTANT

## 2022-12-13 PROCEDURE — 90682 RIV4 VACC RECOMBINANT DNA IM: CPT | Mod: S$GLB,,, | Performed by: PHYSICIAN ASSISTANT

## 2022-12-13 PROCEDURE — 90682 FLU VACCINE - QUADRIVALENT (RECOMBINANT) PRESERVATIVE FREE: ICD-10-PCS | Mod: S$GLB,,, | Performed by: PHYSICIAN ASSISTANT

## 2022-12-13 PROCEDURE — 90471 IMMUNIZATION ADMIN: CPT | Mod: S$GLB,,, | Performed by: PHYSICIAN ASSISTANT

## 2022-12-13 PROCEDURE — 99214 PR OFFICE/OUTPT VISIT, EST, LEVL IV, 30-39 MIN: ICD-10-PCS | Mod: 25,S$GLB,, | Performed by: PHYSICIAN ASSISTANT

## 2022-12-13 PROCEDURE — 3077F SYST BP >= 140 MM HG: CPT | Mod: CPTII,S$GLB,, | Performed by: PHYSICIAN ASSISTANT

## 2022-12-13 PROCEDURE — 3079F PR MOST RECENT DIASTOLIC BLOOD PRESSURE 80-89 MM HG: ICD-10-PCS | Mod: CPTII,S$GLB,, | Performed by: PHYSICIAN ASSISTANT

## 2022-12-13 PROCEDURE — 99214 OFFICE O/P EST MOD 30 MIN: CPT | Mod: 25,S$GLB,, | Performed by: PHYSICIAN ASSISTANT

## 2022-12-13 PROCEDURE — 3008F PR BODY MASS INDEX (BMI) DOCUMENTED: ICD-10-PCS | Mod: CPTII,S$GLB,, | Performed by: PHYSICIAN ASSISTANT

## 2022-12-13 PROCEDURE — 3079F DIAST BP 80-89 MM HG: CPT | Mod: CPTII,S$GLB,, | Performed by: PHYSICIAN ASSISTANT

## 2022-12-13 PROCEDURE — 3077F PR MOST RECENT SYSTOLIC BLOOD PRESSURE >= 140 MM HG: ICD-10-PCS | Mod: CPTII,S$GLB,, | Performed by: PHYSICIAN ASSISTANT

## 2022-12-13 RX ORDER — TRAZODONE HYDROCHLORIDE 50 MG/1
50 TABLET ORAL NIGHTLY
Qty: 30 TABLET | Refills: 5 | Status: SHIPPED | OUTPATIENT
Start: 2022-12-13 | End: 2023-04-04 | Stop reason: SINTOL

## 2022-12-13 RX ORDER — TRIAMCINOLONE ACETONIDE 1 MG/G
CREAM TOPICAL 2 TIMES DAILY
Qty: 45 G | Refills: 1 | Status: SHIPPED | OUTPATIENT
Start: 2022-12-13 | End: 2023-10-30 | Stop reason: SDUPTHER

## 2022-12-13 RX ORDER — FUROSEMIDE 20 MG/1
TABLET ORAL
Qty: 90 TABLET | Refills: 1 | Status: SHIPPED | OUTPATIENT
Start: 2022-12-13 | End: 2023-10-30 | Stop reason: SDUPTHER

## 2022-12-13 RX ORDER — DICLOFENAC SODIUM 75 MG/1
75 TABLET, DELAYED RELEASE ORAL 2 TIMES DAILY
Qty: 60 TABLET | Refills: 2 | Status: SHIPPED | OUTPATIENT
Start: 2022-12-13 | End: 2023-03-13

## 2022-12-13 NOTE — PROGRESS NOTES
SUBJECTIVE:    Patient ID: Noah Cast is a 62 y.o. female.    Chief Complaint: Migraine (No bottles, Flu vaccine and Mammogram ordered, stress, abc )    This is a 62-year-old female who presents today for regular 6 month follow-up. She reports that she has been struggling with anxiety recently due to multiple life stressors, (working FT, caring for elderly mother). Not open to an SSRI or therapy at this point. She does report that she's had some tightness in her chest as well as stomach pain but she thinks it's due to stress. Pt education provided re monitoring for unusual sx of MI, particularly in women. Pt states that she would go to the ER or contact her dr if these sx didn't subside, but they always do once she can calm down and relieve her stress. She does request refills at this time. She is interested in a trial of trazadone as she is really struggling with sleep due to anxiety. She is also interested in weight loss surgery but is concerned that her insurance won't pay for this. Encouraged her to contact and ask and she states that she will try.      No visits with results within 6 Month(s) from this visit.   Latest known visit with results is:   Office Visit on 06/13/2022   Component Date Value Ref Range Status    Cologuard Result 10/03/2022 Negative  Negative Final    WBC 07/27/2022 3.9  3.8 - 10.8 Thousand/uL Final    RBC 07/27/2022 6.23 (H)  3.80 - 5.10 Million/uL Final    Hemoglobin 07/27/2022 11.9  11.7 - 15.5 g/dL Final    Hematocrit 07/27/2022 40.6  35.0 - 45.0 % Final    MCV 07/27/2022 65.2 (L)  80.0 - 100.0 fL Final    MCH 07/27/2022 19.1 (L)  27.0 - 33.0 pg Final    MCHC 07/27/2022 29.3 (L)  32.0 - 36.0 g/dL Final    RDW 07/27/2022 18.4 (H)  11.0 - 15.0 % Final    Platelets 07/27/2022 229  140 - 400 Thousand/uL Final    MPV 07/27/2022 8.8  7.5 - 12.5 fL Final    Neutrophils, Abs 07/27/2022 2,473  1,500 - 7,800 cells/uL Final    Lymph # 07/27/2022 952  850 - 3,900 cells/uL Final    Mono #  2022 296  200 - 950 cells/uL Final    Eos # 2022 148  15 - 500 cells/uL Final    Baso # 2022 31  0 - 200 cells/uL Final    Neutrophils Relative 2022 63.4  % Final    Lymph % 2022 24.4  % Final    Mono % 2022 7.6  % Final    Eosinophil % 2022 3.8  % Final    Basophil % 2022 0.8  % Final    Differential Comment 2022 Microcytosis 1 + Hypochromasia 1 + Anisocytosis 1 +   Final    Glucose 2022 107 (H)  65 - 99 mg/dL Final    BUN 2022 23  7 - 25 mg/dL Final    Creatinine 2022 0.92  0.50 - 1.05 mg/dL Final    eGFR 2022 71  > OR = 60 mL/min/1.73m2 Final    BUN/Creatinine Ratio 2022 NOT APPLICABLE   (calc) Final    Sodium 2022 142  135 - 146 mmol/L Final    Potassium 2022 4.8  3.5 - 5.3 mmol/L Final    Chloride 2022 108  98 - 110 mmol/L Final    CO2 2022 28  20 - 32 mmol/L Final    Calcium 2022 9.1  8.6 - 10.4 mg/dL Final    Total Protein 2022 7.2  6.1 - 8.1 g/dL Final    Albumin 2022 4.7  3.6 - 5.1 g/dL Final    Globulin, Total 2022 2.5  1.9 - 3.7 g/dL (calc) Final    Albumin/Globulin Ratio 2022 1.9  1.0 - 2.5 (calc) Final    Total Bilirubin 2022 0.9  0.2 - 1.2 mg/dL Final    Alkaline Phosphatase 2022 90  37 - 153 U/L Final    AST 2022 14  10 - 35 U/L Final    ALT 2022 16  6 - 29 U/L Final    TSH w/reflex to FT4 2022 2.18  0.40 - 4.50 mIU/L Final    Cholesterol 2022 195  <200 mg/dL Final    HDL 2022 48 (L)  > OR = 50 mg/dL Final    Triglycerides 2022 114  <150 mg/dL Final    LDL Cholesterol 2022 125 (H)  mg/dL (calc) Final    HDL/Cholesterol Ratio 2022 4.1  <5.0 (calc) Final    Non HDL Chol. (LDL+VLDL) 2022 147 (H)  <130 mg/dL (calc) Final       No past medical history on file.  Past Surgical History:   Procedure Laterality Date     SECTION       No family history on file.    Marital Status:   Alcohol  History:  reports no history of alcohol use.  Tobacco History:  reports that she has never smoked. She has never used smokeless tobacco.  Drug History:  reports no history of drug use.    Review of patient's allergies indicates:  No Known Allergies    Current Outpatient Medications:     RESTASIS 0.05 % ophthalmic emulsion, , Disp: , Rfl:     butalbital-acetaminophen-caffeine -40 mg (FIORICET, ESGIC) -40 mg per tablet, Take 1 tablet by mouth 2 (two) times daily as needed., Disp: , Rfl:     ciclopirox (PENLAC) 8 % Soln, Apply topically 2 (two) times daily., Disp: 1 Bottle, Rfl: 2    diclofenac (VOLTAREN) 75 MG EC tablet, Take 1 tablet (75 mg total) by mouth 2 (two) times daily., Disp: 60 tablet, Rfl: 2    furosemide (LASIX) 20 MG tablet, TK 1 T PO QD WITH POTASSIUM  RICH FOOD PRN, Disp: 90 tablet, Rfl: 1    terbinafine HCl (LAMISIL) 250 mg tablet, TK 1 T PO QD FOR 10 DAYS, Disp: , Rfl: 2    traZODone (DESYREL) 50 MG tablet, Take 1 tablet (50 mg total) by mouth every evening., Disp: 30 tablet, Rfl: 5    triamcinolone acetonide 0.1% (KENALOG) 0.1 % cream, Apply topically 2 (two) times daily., Disp: 45 g, Rfl: 1    Review of Systems   Constitutional:  Negative for diaphoresis, fatigue and unexpected weight change.   HENT:  Negative for sinus pain and sore throat.    Eyes:  Negative for pain, redness and visual disturbance.   Respiratory:  Negative for cough, chest tightness, shortness of breath and wheezing.    Cardiovascular:  Negative for chest pain and palpitations.   Gastrointestinal:  Negative for abdominal pain, blood in stool, diarrhea, nausea and vomiting.   Endocrine: Negative for polydipsia, polyphagia and polyuria.   Genitourinary:  Negative for dysuria, frequency and urgency.   Musculoskeletal:  Positive for arthralgias (bilateral knees), back pain and neck pain. Negative for myalgias.   Skin:  Negative for rash.   Allergic/Immunologic: Negative for environmental allergies.   Neurological:   "Negative for dizziness, syncope and headaches.   Psychiatric/Behavioral:  Negative for suicidal ideas. The patient is nervous/anxious.         Objective:      Vitals:    12/13/22 1458   BP: (!) 148/82   Pulse: 76   Weight: 105.2 kg (232 lb)   Height: 5' 1.5" (1.562 m)     Physical Exam  Constitutional:       General: She is not in acute distress.     Appearance: She is well-developed. She is obese.   HENT:      Head: Normocephalic and atraumatic.   Eyes:      Conjunctiva/sclera: Conjunctivae normal.      Pupils: Pupils are equal, round, and reactive to light.   Neck:      Thyroid: No thyromegaly.   Cardiovascular:      Rate and Rhythm: Normal rate and regular rhythm.   Pulmonary:      Effort: Pulmonary effort is normal.      Breath sounds: Normal breath sounds.   Abdominal:      General: Bowel sounds are normal. There is no distension.      Palpations: Abdomen is soft.      Tenderness: There is no abdominal tenderness.   Musculoskeletal:         General: Normal range of motion.      Cervical back: Normal range of motion and neck supple.   Skin:     General: Skin is warm and dry.      Findings: No erythema.   Neurological:      Mental Status: She is alert and oriented to person, place, and time.      Cranial Nerves: No cranial nerve deficit.         Assessment:       1. Primary insomnia    2. Edema of both legs    3. Localized rash    4. Encounter for screening mammogram for malignant neoplasm of breast    5. Arthritis         Plan:       Primary insomnia  Comments:  ok to trial with trazodone at night. will let me know how she does and plan to followup in 2-3 mos.  Orders:  -     traZODone (DESYREL) 50 MG tablet; Take 1 tablet (50 mg total) by mouth every evening.  Dispense: 30 tablet; Refill: 5    Edema of both legs  Comments:  Lasix PRN for edema  Orders:  -     furosemide (LASIX) 20 MG tablet; TK 1 T PO QD WITH POTASSIUM  RICH FOOD PRN  Dispense: 90 tablet; Refill: 1    Localized rash  -     triamcinolone " acetonide 0.1% (KENALOG) 0.1 % cream; Apply topically 2 (two) times daily.  Dispense: 45 g; Refill: 1    Encounter for screening mammogram for malignant neoplasm of breast  -     Mammo Digital Screening Bilat; Future; Expected date: 12/13/2023    Arthritis  Comments:  refills of diclofenac as prescribed.  Orders:  -     diclofenac (VOLTAREN) 75 MG EC tablet; Take 1 tablet (75 mg total) by mouth 2 (two) times daily.  Dispense: 60 tablet; Refill: 2    Other orders  -     Influenza (FLUBLOK) - Quadrivalent (Recombinant) *Preferred* (PF) - egg allergy      Follow up in about 2 months (around 2/13/2023).        12/13/2022 Leonel Nino PA-C

## 2023-03-06 ENCOUNTER — PATIENT MESSAGE (OUTPATIENT)
Dept: FAMILY MEDICINE | Facility: CLINIC | Age: 63
End: 2023-03-06

## 2023-03-13 NOTE — TELEPHONE ENCOUNTER
I am unable to prescribe this medication unless it is for Type 2 diabetes. She does not have this so I would refer her to [place] [facility] if she wishes to pay cash for the injection there as it is much cheaper when her insurance does not cover this.

## 2023-04-04 ENCOUNTER — OFFICE VISIT (OUTPATIENT)
Dept: FAMILY MEDICINE | Facility: CLINIC | Age: 63
End: 2023-04-04
Payer: COMMERCIAL

## 2023-04-04 VITALS
HEIGHT: 62 IN | DIASTOLIC BLOOD PRESSURE: 82 MMHG | SYSTOLIC BLOOD PRESSURE: 132 MMHG | WEIGHT: 232 LBS | HEART RATE: 88 BPM | BODY MASS INDEX: 42.69 KG/M2

## 2023-04-04 DIAGNOSIS — G43.119 INTRACTABLE MIGRAINE WITH AURA WITHOUT STATUS MIGRAINOSUS: Primary | ICD-10-CM

## 2023-04-04 DIAGNOSIS — F51.01 PRIMARY INSOMNIA: ICD-10-CM

## 2023-04-04 PROCEDURE — 3008F BODY MASS INDEX DOCD: CPT | Mod: CPTII,S$GLB,, | Performed by: PHYSICIAN ASSISTANT

## 2023-04-04 PROCEDURE — 99214 PR OFFICE/OUTPT VISIT, EST, LEVL IV, 30-39 MIN: ICD-10-PCS | Mod: S$GLB,,, | Performed by: PHYSICIAN ASSISTANT

## 2023-04-04 PROCEDURE — 3079F DIAST BP 80-89 MM HG: CPT | Mod: CPTII,S$GLB,, | Performed by: PHYSICIAN ASSISTANT

## 2023-04-04 PROCEDURE — 3079F PR MOST RECENT DIASTOLIC BLOOD PRESSURE 80-89 MM HG: ICD-10-PCS | Mod: CPTII,S$GLB,, | Performed by: PHYSICIAN ASSISTANT

## 2023-04-04 PROCEDURE — 1159F PR MEDICATION LIST DOCUMENTED IN MEDICAL RECORD: ICD-10-PCS | Mod: CPTII,S$GLB,, | Performed by: PHYSICIAN ASSISTANT

## 2023-04-04 PROCEDURE — 1159F MED LIST DOCD IN RCRD: CPT | Mod: CPTII,S$GLB,, | Performed by: PHYSICIAN ASSISTANT

## 2023-04-04 PROCEDURE — 3075F PR MOST RECENT SYSTOLIC BLOOD PRESS GE 130-139MM HG: ICD-10-PCS | Mod: CPTII,S$GLB,, | Performed by: PHYSICIAN ASSISTANT

## 2023-04-04 PROCEDURE — 99214 OFFICE O/P EST MOD 30 MIN: CPT | Mod: S$GLB,,, | Performed by: PHYSICIAN ASSISTANT

## 2023-04-04 PROCEDURE — 3075F SYST BP GE 130 - 139MM HG: CPT | Mod: CPTII,S$GLB,, | Performed by: PHYSICIAN ASSISTANT

## 2023-04-04 PROCEDURE — 3008F PR BODY MASS INDEX (BMI) DOCUMENTED: ICD-10-PCS | Mod: CPTII,S$GLB,, | Performed by: PHYSICIAN ASSISTANT

## 2023-04-04 RX ORDER — RIMEGEPANT SULFATE 75 MG/75MG
75 TABLET, ORALLY DISINTEGRATING ORAL ONCE AS NEEDED
Qty: 12 TABLET | Refills: 2 | Status: SHIPPED | OUTPATIENT
Start: 2023-04-04 | End: 2023-04-04

## 2023-04-04 NOTE — PROGRESS NOTES
SUBJECTIVE:    Patient ID: Noah Cast is a 62 y.o. female.    Chief Complaint: Follow-up (Insomnia, went over meds verbally// SW)    This is a 62-year-old female who presents for regular follow-up.  History of migraine headache. Reports that she has not tolerated the new trazodone rx for sleep. Had very vivid dreams and stopped it after a couple nights. Melatonin has been unhelpful for her also. She denies any signs of KAYLYN with the snoring but does wake up feeling unrefreshed. Migraines feel like that they are worsening. She admits twice a week suffering from them. She will be out of commission when they come on. She still suffers from occasional fluid overload. Usually responds to lasix dosed for a few days.      No visits with results within 6 Month(s) from this visit.   Latest known visit with results is:   Office Visit on 06/13/2022   Component Date Value Ref Range Status    Cologuard Result 10/03/2022 Negative  Negative Final    WBC 07/27/2022 3.9  3.8 - 10.8 Thousand/uL Final    RBC 07/27/2022 6.23 (H)  3.80 - 5.10 Million/uL Final    Hemoglobin 07/27/2022 11.9  11.7 - 15.5 g/dL Final    Hematocrit 07/27/2022 40.6  35.0 - 45.0 % Final    MCV 07/27/2022 65.2 (L)  80.0 - 100.0 fL Final    MCH 07/27/2022 19.1 (L)  27.0 - 33.0 pg Final    MCHC 07/27/2022 29.3 (L)  32.0 - 36.0 g/dL Final    RDW 07/27/2022 18.4 (H)  11.0 - 15.0 % Final    Platelets 07/27/2022 229  140 - 400 Thousand/uL Final    MPV 07/27/2022 8.8  7.5 - 12.5 fL Final    Neutrophils, Abs 07/27/2022 2,473  1,500 - 7,800 cells/uL Final    Lymph # 07/27/2022 952  850 - 3,900 cells/uL Final    Mono # 07/27/2022 296  200 - 950 cells/uL Final    Eos # 07/27/2022 148  15 - 500 cells/uL Final    Baso # 07/27/2022 31  0 - 200 cells/uL Final    Neutrophils Relative 07/27/2022 63.4  % Final    Lymph % 07/27/2022 24.4  % Final    Mono % 07/27/2022 7.6  % Final    Eosinophil % 07/27/2022 3.8  % Final    Basophil % 07/27/2022 0.8  % Final     Differential Comment 2022 Microcytosis 1 + Hypochromasia 1 + Anisocytosis 1 +   Final    Glucose 2022 107 (H)  65 - 99 mg/dL Final    BUN 2022 23  7 - 25 mg/dL Final    Creatinine 2022 0.92  0.50 - 1.05 mg/dL Final    eGFR 2022 71  > OR = 60 mL/min/1.73m2 Final    BUN/Creatinine Ratio 2022 NOT APPLICABLE   (calc) Final    Sodium 2022 142  135 - 146 mmol/L Final    Potassium 2022 4.8  3.5 - 5.3 mmol/L Final    Chloride 2022 108  98 - 110 mmol/L Final    CO2 2022 28  20 - 32 mmol/L Final    Calcium 2022 9.1  8.6 - 10.4 mg/dL Final    Total Protein 2022 7.2  6.1 - 8.1 g/dL Final    Albumin 2022 4.7  3.6 - 5.1 g/dL Final    Globulin, Total 2022 2.5  1.9 - 3.7 g/dL (calc) Final    Albumin/Globulin Ratio 2022 1.9  1.0 - 2.5 (calc) Final    Total Bilirubin 2022 0.9  0.2 - 1.2 mg/dL Final    Alkaline Phosphatase 2022 90  37 - 153 U/L Final    AST 2022 14  10 - 35 U/L Final    ALT 2022 16  6 - 29 U/L Final    TSH w/reflex to FT4 2022 2.18  0.40 - 4.50 mIU/L Final    Cholesterol 2022 195  <200 mg/dL Final    HDL 2022 48 (L)  > OR = 50 mg/dL Final    Triglycerides 2022 114  <150 mg/dL Final    LDL Cholesterol 2022 125 (H)  mg/dL (calc) Final    HDL/Cholesterol Ratio 2022 4.1  <5.0 (calc) Final    Non HDL Chol. (LDL+VLDL) 2022 147 (H)  <130 mg/dL (calc) Final       History reviewed. No pertinent past medical history.  Past Surgical History:   Procedure Laterality Date     SECTION       History reviewed. No pertinent family history.    Marital Status:   Alcohol History:  reports no history of alcohol use.  Tobacco History:  reports that she has never smoked. She has never been exposed to tobacco smoke. She has never used smokeless tobacco.  Drug History:  reports no history of drug use.    Review of patient's allergies indicates:  No Known  "Allergies    Current Outpatient Medications:     butalbital-acetaminophen-caffeine -40 mg (FIORICET, ESGIC) -40 mg per tablet, Take 1 tablet by mouth 2 (two) times daily as needed., Disp: , Rfl:     furosemide (LASIX) 20 MG tablet, TK 1 T PO QD WITH POTASSIUM  RICH FOOD PRN, Disp: 90 tablet, Rfl: 1    RESTASIS 0.05 % ophthalmic emulsion, , Disp: , Rfl:     terbinafine HCl (LAMISIL) 250 mg tablet, TK 1 T PO QD FOR 10 DAYS, Disp: , Rfl: 2    triamcinolone acetonide 0.1% (KENALOG) 0.1 % cream, Apply topically 2 (two) times daily., Disp: 45 g, Rfl: 1    rimegepant (NURTEC) 75 mg odt, Take 1 tablet (75 mg total) by mouth once as needed for Migraine. Place ODT tablet on the tongue; alternatively the ODT tablet may be placed under the tongue, Disp: 12 tablet, Rfl: 2    Review of Systems   Constitutional:  Negative for diaphoresis, fatigue and unexpected weight change.   HENT:  Negative for sinus pain and sore throat.    Eyes:  Negative for pain, redness and visual disturbance.   Respiratory:  Negative for cough, chest tightness, shortness of breath and wheezing.    Cardiovascular:  Negative for chest pain and palpitations.   Gastrointestinal:  Negative for abdominal pain, blood in stool, diarrhea, nausea and vomiting.   Endocrine: Negative for polydipsia, polyphagia and polyuria.   Genitourinary:  Negative for dysuria, frequency and urgency.   Musculoskeletal:  Positive for arthralgias (bilateral knees), back pain and neck pain. Negative for myalgias.   Skin:  Negative for rash.   Allergic/Immunologic: Negative for environmental allergies.   Neurological:  Positive for headaches (more frequent). Negative for dizziness and syncope.   Psychiatric/Behavioral:  Negative for suicidal ideas. The patient is nervous/anxious.         Objective:      Vitals:    04/04/23 1538   BP: 132/82   Pulse: 88   Weight: 105.2 kg (232 lb)   Height: 5' 1.5" (1.562 m)     Physical Exam  Constitutional:       General: She is not in " acute distress.     Appearance: She is well-developed. She is obese.   HENT:      Head: Normocephalic and atraumatic.   Eyes:      Conjunctiva/sclera: Conjunctivae normal.      Pupils: Pupils are equal, round, and reactive to light.   Neck:      Thyroid: No thyromegaly.   Cardiovascular:      Rate and Rhythm: Normal rate and regular rhythm.   Pulmonary:      Effort: Pulmonary effort is normal.      Breath sounds: Normal breath sounds.   Abdominal:      General: Bowel sounds are normal. There is no distension.      Palpations: Abdomen is soft.      Tenderness: There is no abdominal tenderness.   Musculoskeletal:         General: Normal range of motion.      Cervical back: Normal range of motion and neck supple.   Skin:     General: Skin is warm and dry.      Findings: No erythema.   Neurological:      Mental Status: She is alert and oriented to person, place, and time.      Cranial Nerves: No cranial nerve deficit.         Assessment:       1. Intractable migraine with aura without status migrainosus    2. Primary insomnia         Plan:       Intractable migraine with aura without status migrainosus  Comments:  has had eval with neurology but only ever tried ubrelvy. would like to try nurtec now and see how she does.  Orders:  -     rimegepant (NURTEC) 75 mg odt; Take 1 tablet (75 mg total) by mouth once as needed for Migraine. Place ODT tablet on the tongue; alternatively the ODT tablet may be placed under the tongue  Dispense: 12 tablet; Refill: 2    Primary insomnia  Comments:  ineffective with regard to trazodone. would consider orexant in the future if continues to be an issue. melatonin recommended.      Follow up in about 6 months (around 10/4/2023) for Annual Physical.        4/4/2023 Leonel Nino PA-C

## 2023-04-10 DIAGNOSIS — G43.119 INTRACTABLE MIGRAINE WITH AURA WITHOUT STATUS MIGRAINOSUS: Primary | ICD-10-CM

## 2023-04-10 RX ORDER — RIMEGEPANT SULFATE 75 MG/75MG
75 TABLET, ORALLY DISINTEGRATING ORAL DAILY PRN
Qty: 12 TABLET | Refills: 2 | Status: SHIPPED | OUTPATIENT
Start: 2023-04-10 | End: 2023-10-30 | Stop reason: SDUPTHER

## 2023-04-10 NOTE — TELEPHONE ENCOUNTER
Nurtec prescription has . Rx set back up to be refilled. Ok for PA?    Pt notified we are working on it.

## 2023-04-10 NOTE — TELEPHONE ENCOUNTER
----- Message from Eileen Rinaldi MA sent at 4/10/2023  3:32 PM CDT -----  Regarding: [employer] needs auth  [facility] informs patient her nertek needs prior susana banks   [phone]

## 2023-04-11 ENCOUNTER — PATIENT MESSAGE (OUTPATIENT)
Dept: ADMINISTRATIVE | Facility: HOSPITAL | Age: 63
End: 2023-04-11
Payer: COMMERCIAL

## 2023-04-25 ENCOUNTER — TELEPHONE (OUTPATIENT)
Dept: FAMILY MEDICINE | Facility: CLINIC | Age: 63
End: 2023-04-25

## 2023-04-26 DIAGNOSIS — G43.119 INTRACTABLE MIGRAINE WITH AURA WITHOUT STATUS MIGRAINOSUS: Primary | ICD-10-CM

## 2023-04-26 NOTE — TELEPHONE ENCOUNTER
----- Message from Florence Means sent at 4/26/2023  9:42 AM CDT -----  PA for [facility] states pt has to have a tried and failed to two triptans and list them. Pt states she has not tried a triptan. Is there clinical evidence or patient history that suggests the generically available triptan therapies will be ineffective or cause an adverse reaction to the patient? Please advise. Thank you

## 2023-04-27 RX ORDER — RIZATRIPTAN BENZOATE 10 MG/1
10 TABLET, ORALLY DISINTEGRATING ORAL
Qty: 12 TABLET | Refills: 2 | Status: SHIPPED | OUTPATIENT
Start: 2023-04-27

## 2023-04-27 NOTE — TELEPHONE ENCOUNTER
I was trying to avoid triptan due to side effect of drowsiness. But we could try rizatriptan 10mg to take at earliest onset of headache. Another dose an hour later if needed. ODT version dispense #12 with 2 refills. Let me know if any issue with medication

## 2023-04-27 NOTE — TELEPHONE ENCOUNTER
Spoke to pt and let her know per christian verbatim below. Pt verbalized understanding. Pharmacy [facility] [address]    Rizatriptkomal pending

## 2023-09-07 ENCOUNTER — TELEPHONE (OUTPATIENT)
Dept: FAMILY MEDICINE | Facility: CLINIC | Age: 63
End: 2023-09-07

## 2023-09-07 NOTE — TELEPHONE ENCOUNTER
Spoke with pt in regards to message sent. Appointment scheduled for 10/30/2023. Pt acknowledged understanding.

## 2023-09-07 NOTE — TELEPHONE ENCOUNTER
----- Message from Nico Herrera sent at 9/7/2023  7:59 AM CDT -----  Pt needs to reschedule cancelled 10/2 appt with Rich. [phone]

## 2023-10-03 ENCOUNTER — TELEPHONE (OUTPATIENT)
Dept: FAMILY MEDICINE | Facility: CLINIC | Age: 63
End: 2023-10-03

## 2023-10-03 NOTE — TELEPHONE ENCOUNTER
----- Message from Florence Means sent at 10/3/2023 11:40 AM CDT -----  Pt wants to know if she can get a sooner appt. Pt #[phone]

## 2023-10-05 ENCOUNTER — HOSPITAL ENCOUNTER (OUTPATIENT)
Dept: RADIOLOGY | Facility: HOSPITAL | Age: 63
Discharge: HOME | End: 2023-10-05
Attending: PHYSICIAN ASSISTANT
Payer: COMMERCIAL

## 2023-10-05 DIAGNOSIS — Z12.31 ENCOUNTER FOR SCREENING MAMMOGRAM FOR MALIGNANT NEOPLASM OF BREAST: ICD-10-CM

## 2023-10-05 PROCEDURE — 77067 SCR MAMMO BI INCL CAD: CPT | Mod: TC,PO

## 2023-10-30 ENCOUNTER — OFFICE VISIT (OUTPATIENT)
Dept: FAMILY MEDICINE | Facility: CLINIC | Age: 63
End: 2023-10-30
Payer: COMMERCIAL

## 2023-10-30 VITALS
WEIGHT: 236 LBS | OXYGEN SATURATION: 95 % | HEART RATE: 90 BPM | HEIGHT: 62 IN | BODY MASS INDEX: 43.43 KG/M2 | SYSTOLIC BLOOD PRESSURE: 136 MMHG | DIASTOLIC BLOOD PRESSURE: 82 MMHG

## 2023-10-30 DIAGNOSIS — G43.119 INTRACTABLE MIGRAINE WITH AURA WITHOUT STATUS MIGRAINOSUS: Primary | ICD-10-CM

## 2023-10-30 DIAGNOSIS — R06.02 SHORTNESS OF BREATH: ICD-10-CM

## 2023-10-30 DIAGNOSIS — R60.0 EDEMA OF BOTH LEGS: ICD-10-CM

## 2023-10-30 DIAGNOSIS — R21 LOCALIZED RASH: ICD-10-CM

## 2023-10-30 DIAGNOSIS — Z23 NEED FOR VACCINATION: ICD-10-CM

## 2023-10-30 DIAGNOSIS — R01.1 HEART MURMUR: ICD-10-CM

## 2023-10-30 DIAGNOSIS — Z79.899 ENCOUNTER FOR LONG-TERM (CURRENT) USE OF OTHER MEDICATIONS: ICD-10-CM

## 2023-10-30 PROCEDURE — 3079F DIAST BP 80-89 MM HG: CPT | Mod: CPTII,S$GLB,, | Performed by: PHYSICIAN ASSISTANT

## 2023-10-30 PROCEDURE — 1159F MED LIST DOCD IN RCRD: CPT | Mod: CPTII,S$GLB,, | Performed by: PHYSICIAN ASSISTANT

## 2023-10-30 PROCEDURE — 90686 IIV4 VACC NO PRSV 0.5 ML IM: CPT | Mod: S$GLB,,, | Performed by: PHYSICIAN ASSISTANT

## 2023-10-30 PROCEDURE — 3079F PR MOST RECENT DIASTOLIC BLOOD PRESSURE 80-89 MM HG: ICD-10-PCS | Mod: CPTII,S$GLB,, | Performed by: PHYSICIAN ASSISTANT

## 2023-10-30 PROCEDURE — 90471 FLU VACCINE (QUAD) GREATER THAN OR EQUAL TO 3YO PRESERVATIVE FREE IM: ICD-10-PCS | Mod: S$GLB,,, | Performed by: PHYSICIAN ASSISTANT

## 2023-10-30 PROCEDURE — 90686 FLU VACCINE (QUAD) GREATER THAN OR EQUAL TO 3YO PRESERVATIVE FREE IM: ICD-10-PCS | Mod: S$GLB,,, | Performed by: PHYSICIAN ASSISTANT

## 2023-10-30 PROCEDURE — 3075F PR MOST RECENT SYSTOLIC BLOOD PRESS GE 130-139MM HG: ICD-10-PCS | Mod: CPTII,S$GLB,, | Performed by: PHYSICIAN ASSISTANT

## 2023-10-30 PROCEDURE — 3008F BODY MASS INDEX DOCD: CPT | Mod: CPTII,S$GLB,, | Performed by: PHYSICIAN ASSISTANT

## 2023-10-30 PROCEDURE — 90471 IMMUNIZATION ADMIN: CPT | Mod: S$GLB,,, | Performed by: PHYSICIAN ASSISTANT

## 2023-10-30 PROCEDURE — 99214 OFFICE O/P EST MOD 30 MIN: CPT | Mod: 25,S$GLB,, | Performed by: PHYSICIAN ASSISTANT

## 2023-10-30 PROCEDURE — 1159F PR MEDICATION LIST DOCUMENTED IN MEDICAL RECORD: ICD-10-PCS | Mod: CPTII,S$GLB,, | Performed by: PHYSICIAN ASSISTANT

## 2023-10-30 PROCEDURE — 3075F SYST BP GE 130 - 139MM HG: CPT | Mod: CPTII,S$GLB,, | Performed by: PHYSICIAN ASSISTANT

## 2023-10-30 PROCEDURE — 99214 PR OFFICE/OUTPT VISIT, EST, LEVL IV, 30-39 MIN: ICD-10-PCS | Mod: 25,S$GLB,, | Performed by: PHYSICIAN ASSISTANT

## 2023-10-30 PROCEDURE — 3008F PR BODY MASS INDEX (BMI) DOCUMENTED: ICD-10-PCS | Mod: CPTII,S$GLB,, | Performed by: PHYSICIAN ASSISTANT

## 2023-10-30 RX ORDER — FUROSEMIDE 20 MG/1
TABLET ORAL
Qty: 90 TABLET | Refills: 1 | Status: SHIPPED | OUTPATIENT
Start: 2023-10-30

## 2023-10-30 RX ORDER — TRIAMCINOLONE ACETONIDE 1 MG/G
CREAM TOPICAL 2 TIMES DAILY
Qty: 45 G | Refills: 1 | Status: SHIPPED | OUTPATIENT
Start: 2023-10-30

## 2023-10-30 RX ORDER — RIMEGEPANT SULFATE 75 MG/75MG
75 TABLET, ORALLY DISINTEGRATING ORAL DAILY PRN
Qty: 12 TABLET | Refills: 3 | Status: SHIPPED | OUTPATIENT
Start: 2023-10-30

## 2023-10-30 NOTE — PROGRESS NOTES
"  SUBJECTIVE:    Patient ID: Noah Cast is a 63 y.o. female.    Chief Complaint: Follow-up (Bottles brought//Pt is here for a check up and medication refills//Pt would her flu vaccine//ALEX )    This is a 62 yo female who presents today for checkup and refills. Reports that she has been doing pretty well overall. She has retired from her job. Still suffers from intractable migraines. Nurtec does seem to help better than the ubrelvy did. Sometimes still retreats to a dark room to "sleep it off". Was seen at the urgent care last week for cough and congestion. Given doxy, tessalon and prednisone that is helping some.         No visits with results within 6 Month(s) from this visit.   Latest known visit with results is:   Office Visit on 06/13/2022   Component Date Value Ref Range Status    Cologuard Result 10/03/2022 Negative  Negative Final    WBC 07/27/2022 3.9  3.8 - 10.8 Thousand/uL Final    RBC 07/27/2022 6.23 (H)  3.80 - 5.10 Million/uL Final    Hemoglobin 07/27/2022 11.9  11.7 - 15.5 g/dL Final    Hematocrit 07/27/2022 40.6  35.0 - 45.0 % Final    MCV 07/27/2022 65.2 (L)  80.0 - 100.0 fL Final    MCH 07/27/2022 19.1 (L)  27.0 - 33.0 pg Final    MCHC 07/27/2022 29.3 (L)  32.0 - 36.0 g/dL Final    RDW 07/27/2022 18.4 (H)  11.0 - 15.0 % Final    Platelets 07/27/2022 229  140 - 400 Thousand/uL Final    MPV 07/27/2022 8.8  7.5 - 12.5 fL Final    Neutrophils, Abs 07/27/2022 2,473  1,500 - 7,800 cells/uL Final    Lymph # 07/27/2022 952  850 - 3,900 cells/uL Final    Mono # 07/27/2022 296  200 - 950 cells/uL Final    Eos # 07/27/2022 148  15 - 500 cells/uL Final    Baso # 07/27/2022 31  0 - 200 cells/uL Final    Neutrophils Relative 07/27/2022 63.4  % Final    Lymph % 07/27/2022 24.4  % Final    Mono % 07/27/2022 7.6  % Final    Eosinophil % 07/27/2022 3.8  % Final    Basophil % 07/27/2022 0.8  % Final    Differential Comment 07/27/2022 Microcytosis 1 + Hypochromasia 1 + Anisocytosis 1 +   Final    Glucose " 2022 107 (H)  65 - 99 mg/dL Final    BUN 2022 23  7 - 25 mg/dL Final    Creatinine 2022 0.92  0.50 - 1.05 mg/dL Final    eGFR 2022 71  > OR = 60 mL/min/1.73m2 Final    BUN/Creatinine Ratio 2022 NOT APPLICABLE  6 -  (calc) Final    Sodium 2022 142  135 - 146 mmol/L Final    Potassium 2022 4.8  3.5 - 5.3 mmol/L Final    Chloride 2022 108  98 - 110 mmol/L Final    CO2 2022 28  20 - 32 mmol/L Final    Calcium 2022 9.1  8.6 - 10.4 mg/dL Final    Total Protein 2022 7.2  6.1 - 8.1 g/dL Final    Albumin 2022 4.7  3.6 - 5.1 g/dL Final    Globulin, Total 2022 2.5  1.9 - 3.7 g/dL (calc) Final    Albumin/Globulin Ratio 2022 1.9  1.0 - 2.5 (calc) Final    Total Bilirubin 2022 0.9  0.2 - 1.2 mg/dL Final    Alkaline Phosphatase 2022 90  37 - 153 U/L Final    AST 2022 14  10 - 35 U/L Final    ALT 2022 16  6 - 29 U/L Final    TSH w/reflex to FT4 2022 2.18  0.40 - 4.50 mIU/L Final    Cholesterol 2022 195  <200 mg/dL Final    HDL 2022 48 (L)  > OR = 50 mg/dL Final    Triglycerides 2022 114  <150 mg/dL Final    LDL Cholesterol 2022 125 (H)  mg/dL (calc) Final    HDL/Cholesterol Ratio 2022 4.1  <5.0 (calc) Final    Non HDL Chol. (LDL+VLDL) 2022 147 (H)  <130 mg/dL (calc) Final       History reviewed. No pertinent past medical history.  Past Surgical History:   Procedure Laterality Date     SECTION       History reviewed. No pertinent family history.    Marital Status:   Alcohol History:  reports no history of alcohol use.  Tobacco History:  reports that she has never smoked. She has never been exposed to tobacco smoke. She has never used smokeless tobacco.  Drug History:  reports no history of drug use.    Review of patient's allergies indicates:  No Known Allergies    Current Outpatient Medications:     butalbital-acetaminophen-caffeine -40 mg (FIORICET, ESGIC)  "-40 mg per tablet, Take 1 tablet by mouth 2 (two) times daily as needed., Disp: , Rfl:     RESTASIS 0.05 % ophthalmic emulsion, , Disp: , Rfl:     furosemide (LASIX) 20 MG tablet, TK 1 T PO QD WITH POTASSIUM  RICH FOOD PRN, Disp: 90 tablet, Rfl: 1    rimegepant (NURTEC) 75 mg odt, Take 1 tablet (75 mg total) by mouth daily as needed for Migraine. Place ODT tablet on the tongue; alternatively the ODT tablet may be placed under the tongue, Disp: 12 tablet, Rfl: 3    rizatriptan (MAXALT-MLT) 10 MG disintegrating tablet, Take 1 tablet (10 mg total) by mouth as needed for Migraine. May repeat in 1 hours if needed (Patient not taking: Reported on 10/30/2023), Disp: 12 tablet, Rfl: 2    triamcinolone acetonide 0.1% (KENALOG) 0.1 % cream, Apply topically 2 (two) times daily., Disp: 45 g, Rfl: 1    Review of Systems   Constitutional:  Negative for diaphoresis, fatigue and unexpected weight change.   HENT:  Negative for sinus pain and sore throat.    Eyes:  Negative for pain, redness and visual disturbance.   Respiratory:  Negative for cough, chest tightness, shortness of breath and wheezing.    Cardiovascular:  Negative for chest pain and palpitations.   Gastrointestinal:  Negative for abdominal pain, blood in stool, diarrhea, nausea and vomiting.   Endocrine: Negative for polydipsia, polyphagia and polyuria.   Genitourinary:  Negative for dysuria, frequency and urgency.   Musculoskeletal:  Positive for arthralgias (bilateral knees), back pain and neck pain. Negative for myalgias.   Skin:  Negative for rash.   Allergic/Immunologic: Negative for environmental allergies.   Neurological:  Negative for dizziness, syncope and headaches.   Psychiatric/Behavioral:  Negative for suicidal ideas. The patient is nervous/anxious.           Objective:      Vitals:    10/30/23 1447   BP: 136/82   Pulse: 90   SpO2: 95%   Weight: 107 kg (236 lb)   Height: 5' 1.5" (1.562 m)     Physical Exam  Constitutional:       General: She is not " in acute distress.     Appearance: She is well-developed. She is obese.   HENT:      Head: Normocephalic and atraumatic.   Eyes:      Conjunctiva/sclera: Conjunctivae normal.      Pupils: Pupils are equal, round, and reactive to light.   Neck:      Thyroid: No thyromegaly.   Cardiovascular:      Rate and Rhythm: Normal rate and regular rhythm.      Heart sounds: Murmur (holosystolic murmur 3/6) heard.   Pulmonary:      Effort: Pulmonary effort is normal.      Breath sounds: Normal breath sounds.   Abdominal:      General: Bowel sounds are normal. There is no distension.      Palpations: Abdomen is soft.      Tenderness: There is no abdominal tenderness.   Musculoskeletal:         General: Normal range of motion.      Cervical back: Normal range of motion and neck supple.   Skin:     General: Skin is warm and dry.      Findings: No erythema.   Neurological:      Mental Status: She is alert and oriented to person, place, and time.      Cranial Nerves: No cranial nerve deficit.           Assessment:       1. Intractable migraine with aura without status migrainosus    2. Need for vaccination    3. Edema of both legs    4. Localized rash    5. Encounter for long-term (current) use of other medications    6. Shortness of breath    7. Heart murmur         Plan:       Intractable migraine with aura without status migrainosus  Comments:  refills of nurtec as prescribed. more effective for her than ubrelvy.  Orders:  -     rimegepant (NURTEC) 75 mg odt; Take 1 tablet (75 mg total) by mouth daily as needed for Migraine. Place ODT tablet on the tongue; alternatively the ODT tablet may be placed under the tongue  Dispense: 12 tablet; Refill: 3    Need for vaccination  Comments:  flu shot given today.  Orders:  -     Influenza - Quadrivalent (PF)    Edema of both legs  Comments:  Lasix PRN for edema, refills today  Orders:  -     furosemide (LASIX) 20 MG tablet; TK 1 T PO QD WITH POTASSIUM  RICH FOOD PRN  Dispense: 90 tablet;  Refill: 1    Localized rash  Comments:  TAC given to use on rash prn.  Orders:  -     triamcinolone acetonide 0.1% (KENALOG) 0.1 % cream; Apply topically 2 (two) times daily.  Dispense: 45 g; Refill: 1    Encounter for long-term (current) use of other medications  -     CBC Auto Differential; Future; Expected date: 10/30/2023  -     Comprehensive Metabolic Panel; Future; Expected date: 10/30/2023  -     Lipid Panel; Future; Expected date: 10/30/2023  -     TSH w/reflex to FT4; Future; Expected date: 10/30/2023  -     Urinalysis, Reflex to Urine Culture Urine, Clean Catch; Future; Expected date: 10/30/2023    Shortness of breath  Comments:  worsening over the past several months, murmur appreciated. would like to check echo for further eval.  Orders:  -     furosemide (LASIX) 20 MG tablet; TK 1 T PO QD WITH POTASSIUM  RICH FOOD PRN  Dispense: 90 tablet; Refill: 1  -     Echo Saline Bubble? No; Future    Heart murmur  Comments:  will await echo and cards referral.  Orders:  -     furosemide (LASIX) 20 MG tablet; TK 1 T PO QD WITH POTASSIUM  RICH FOOD PRN  Dispense: 90 tablet; Refill: 1  -     Echo Saline Bubble? No; Future      Follow up in about 3 months (around 1/30/2024).        10/30/2023 Leonel Nino PA-C

## 2023-10-31 ENCOUNTER — TELEPHONE (OUTPATIENT)
Dept: FAMILY MEDICINE | Facility: CLINIC | Age: 63
End: 2023-10-31

## 2023-11-02 ENCOUNTER — TELEPHONE (OUTPATIENT)
Dept: FAMILY MEDICINE | Facility: CLINIC | Age: 63
End: 2023-11-02

## 2023-11-02 ENCOUNTER — HOSPITAL ENCOUNTER (OUTPATIENT)
Dept: CARDIOLOGY | Facility: HOSPITAL | Age: 63
Discharge: HOME | End: 2023-11-02
Attending: PHYSICIAN ASSISTANT
Payer: COMMERCIAL

## 2023-11-02 VITALS — HEIGHT: 61 IN | WEIGHT: 237 LBS | BODY MASS INDEX: 44.75 KG/M2

## 2023-11-02 DIAGNOSIS — R01.1 HEART MURMUR: ICD-10-CM

## 2023-11-02 DIAGNOSIS — R06.02 SHORTNESS OF BREATH: ICD-10-CM

## 2023-11-02 LAB
ALBUMIN SERPL-MCNC: 4.5 G/DL (ref 3.6–5.1)
ALBUMIN/GLOB SERPL: 1.7 (CALC) (ref 1–2.5)
ALP SERPL-CCNC: 47 U/L (ref 37–153)
ALT SERPL-CCNC: 12 U/L (ref 6–29)
AORTIC ROOT ANNULUS: 2.7 CM
AORTIC VALVE CUSP SEPERATION: 1.5 CM
APPEARANCE UR: CLEAR
AST SERPL-CCNC: 10 U/L (ref 10–35)
AV INDEX (PROSTH): 0.54
AV MEAN GRADIENT: 16 MMHG
AV PEAK GRADIENT: 29 MMHG
AV REGURGITATION PRESSURE HALF TIME: 1288 MS
AV VALVE AREA BY VELOCITY RATIO: 1.51 CM²
AV VALVE AREA: 1.69 CM²
AV VELOCITY RATIO: 0.48
BACTERIA #/AREA URNS HPF: NORMAL /HPF
BACTERIA UR CULT: NORMAL
BASOPHILS # BLD AUTO: 29 CELLS/UL (ref 0–200)
BASOPHILS NFR BLD AUTO: 0.6 %
BILIRUB SERPL-MCNC: 0.9 MG/DL (ref 0.2–1.2)
BILIRUB UR QL STRIP: NEGATIVE
BSA FOR ECHO PROCEDURE: 2.15 M2
BUN SERPL-MCNC: 20 MG/DL (ref 7–25)
BUN/CREAT SERPL: NORMAL (CALC) (ref 6–22)
CALCIUM SERPL-MCNC: 9.5 MG/DL (ref 8.6–10.4)
CHLORIDE SERPL-SCNC: 104 MMOL/L (ref 98–110)
CHOLEST SERPL-MCNC: 196 MG/DL
CHOLEST/HDLC SERPL: 4.4 (CALC)
CO2 SERPL-SCNC: 29 MMOL/L (ref 20–32)
COLOR UR: YELLOW
CREAT SERPL-MCNC: 0.83 MG/DL (ref 0.5–1.05)
CV ECHO LV RWT: 0.46 CM
DOP CALC AO PEAK VEL: 2.69 M/S
DOP CALC AO VTI: 50.4 CM
DOP CALC LVOT AREA: 3.1 CM2
DOP CALC LVOT DIAMETER: 2 CM
DOP CALC LVOT PEAK VEL: 1.29 M/S
DOP CALC LVOT STROKE VOLUME: 85.41 CM3
DOP CALCLVOT PEAK VEL VTI: 27.2 CM
E WAVE DECELERATION TIME: 194 MSEC
E/A RATIO: 0.55
E/E' RATIO: 8.29 M/S
ECHO LV POSTERIOR WALL: 1.08 CM (ref 0.6–1.1)
EGFR: 80 ML/MIN/1.73M2
EOSINOPHIL # BLD AUTO: 139 CELLS/UL (ref 15–500)
EOSINOPHIL NFR BLD AUTO: 2.9 %
ERYTHROCYTE [DISTWIDTH] IN BLOOD BY AUTOMATED COUNT: 17.3 % (ref 11–15)
FRACTIONAL SHORTENING: 38 % (ref 28–44)
GLOBULIN SER CALC-MCNC: 2.6 G/DL (CALC) (ref 1.9–3.7)
GLUCOSE SERPL-MCNC: 98 MG/DL (ref 65–99)
GLUCOSE UR QL STRIP: NEGATIVE
HCT VFR BLD AUTO: 39.8 % (ref 35–45)
HDLC SERPL-MCNC: 45 MG/DL
HGB BLD-MCNC: 12.3 G/DL (ref 11.7–15.5)
HGB UR QL STRIP: NEGATIVE
HYALINE CASTS #/AREA URNS LPF: NORMAL /LPF
INTERVENTRICULAR SEPTUM: 1.07 CM (ref 0.6–1.1)
IVRT: 162 MSEC
KETONES UR QL STRIP: NEGATIVE
LDLC SERPL CALC-MCNC: 123 MG/DL (CALC)
LEFT INTERNAL DIMENSION IN SYSTOLE: 2.92 CM (ref 2.1–4)
LEFT VENTRICLE DIASTOLIC VOLUME INDEX: 50.25 ML/M2
LEFT VENTRICLE DIASTOLIC VOLUME: 102 ML
LEFT VENTRICLE MASS INDEX: 89 G/M2
LEFT VENTRICLE SYSTOLIC VOLUME INDEX: 16.2 ML/M2
LEFT VENTRICLE SYSTOLIC VOLUME: 32.8 ML
LEFT VENTRICULAR INTERNAL DIMENSION IN DIASTOLE: 4.69 CM (ref 3.5–6)
LEFT VENTRICULAR MASS: 181.02 G
LEUKOCYTE ESTERASE UR QL STRIP: NEGATIVE
LV LATERAL E/E' RATIO: 7.25 M/S
LV SEPTAL E/E' RATIO: 9.67 M/S
LVOT MG: 4 MMHG
LVOT MV: 0.88 CM/S
LYMPHOCYTES # BLD AUTO: 1013 CELLS/UL (ref 850–3900)
LYMPHOCYTES NFR BLD AUTO: 21.1 %
MCH RBC QN AUTO: 20 PG (ref 27–33)
MCHC RBC AUTO-ENTMCNC: 30.9 G/DL (ref 32–36)
MCV RBC AUTO: 64.7 FL (ref 80–100)
MONOCYTES # BLD AUTO: 302 CELLS/UL (ref 200–950)
MONOCYTES NFR BLD AUTO: 6.3 %
MORPHOLOGY BLD-IMP: ABNORMAL
MV PEAK A VEL: 1.06 M/S
MV PEAK E VEL: 0.58 M/S
MV STENOSIS PRESSURE HALF TIME: 73 MS
MV VALVE AREA P 1/2 METHOD: 3.01 CM2
NEUTROPHILS # BLD AUTO: 3317 CELLS/UL (ref 1500–7800)
NEUTROPHILS NFR BLD AUTO: 69.1 %
NITRITE UR QL STRIP: NEGATIVE
NONHDLC SERPL-MCNC: 151 MG/DL (CALC)
PH UR STRIP: 5.5 [PH] (ref 5–8)
PISA AR MAX VEL: 2.17 M/S
PISA TR MAX VEL: 2.41 M/S
PLATELET # BLD AUTO: 264 THOUSAND/UL (ref 140–400)
PMV BLD REES-ECKER: 9 FL (ref 7.5–12.5)
POTASSIUM SERPL-SCNC: 4.7 MMOL/L (ref 3.5–5.3)
PROT SERPL-MCNC: 7.1 G/DL (ref 6.1–8.1)
PROT UR QL STRIP: NEGATIVE
RA PRESSURE ESTIMATED: 3 MMHG
RBC # BLD AUTO: 6.15 MILLION/UL (ref 3.8–5.1)
RBC #/AREA URNS HPF: NORMAL /HPF
RIGHT VENTRICULAR END-DIASTOLIC DIMENSION: 2.19 CM
RV TB RVSP: 5 MMHG
SERVICE CMNT-IMP: NORMAL
SODIUM SERPL-SCNC: 140 MMOL/L (ref 135–146)
SP GR UR STRIP: 1.02 (ref 1–1.03)
SQUAMOUS #/AREA URNS HPF: NORMAL /HPF
TDI LATERAL: 0.08 M/S
TDI SEPTAL: 0.06 M/S
TDI: 0.07 M/S
TR MAX PG: 23 MMHG
TRIGL SERPL-MCNC: 165 MG/DL
TSH SERPL-ACNC: 2.15 MIU/L (ref 0.4–4.5)
TV REST PULMONARY ARTERY PRESSURE: 26 MMHG
WBC # BLD AUTO: 4.8 THOUSAND/UL (ref 3.8–10.8)
WBC #/AREA URNS HPF: NORMAL /HPF
Z-SCORE OF LEFT VENTRICULAR DIMENSION IN END DIASTOLE: -2.49
Z-SCORE OF LEFT VENTRICULAR DIMENSION IN END SYSTOLE: -1.85

## 2023-11-02 PROCEDURE — 93306 ECHO (CUPID ONLY): ICD-10-PCS | Mod: 26,,, | Performed by: INTERNAL MEDICINE

## 2023-11-02 PROCEDURE — 93306 TTE W/DOPPLER COMPLETE: CPT | Mod: 26,,, | Performed by: INTERNAL MEDICINE

## 2023-11-02 PROCEDURE — 93306 TTE W/DOPPLER COMPLETE: CPT

## 2023-11-02 NOTE — TELEPHONE ENCOUNTER
----- Message from Florence Means sent at 11/2/2023  4:05 PM CDT -----  PA for [facility] has been approved for 11/2/23 - 11/1/24. LMOR. Thank you

## 2023-11-06 ENCOUNTER — TELEPHONE (OUTPATIENT)
Dept: FAMILY MEDICINE | Facility: CLINIC | Age: 63
End: 2023-11-06

## 2023-11-06 DIAGNOSIS — R01.1 HEART MURMUR: Primary | ICD-10-CM

## 2023-11-06 NOTE — TELEPHONE ENCOUNTER
----- Message from Leonel Nino PA-C sent at 11/6/2023  7:52 AM CST -----  Moderate aortic stenosis on echo. This confirms the murmur that I was hearing on exam. Would like to get her evaluated with cardiology. Will be something that needs to be monitored and assessed over time. If she agrees can place referral to Dr. Rao

## 2023-11-08 ENCOUNTER — TELEPHONE (OUTPATIENT)
Dept: FAMILY MEDICINE | Facility: CLINIC | Age: 63
End: 2023-11-08
Payer: COMMERCIAL

## 2023-11-13 ENCOUNTER — PATIENT MESSAGE (OUTPATIENT)
Dept: FAMILY MEDICINE | Facility: CLINIC | Age: 63
End: 2023-11-13

## 2023-11-13 NOTE — TELEPHONE ENCOUNTER
Doxycycline 100 po bid for 7 days. Can also send cheratussin cough syrup. If she agrees can load for me to send

## 2023-11-14 RX ORDER — CODEINE PHOSPHATE AND GUAIFENESIN 10; 100 MG/5ML; MG/5ML
5 SOLUTION ORAL 3 TIMES DAILY PRN
Qty: 118 ML | Refills: 0 | Status: SHIPPED | OUTPATIENT
Start: 2023-11-14 | End: 2023-11-24

## 2023-11-14 RX ORDER — DOXYCYCLINE HYCLATE 100 MG
100 TABLET ORAL 2 TIMES DAILY
Qty: 14 TABLET | Refills: 0 | Status: SHIPPED | OUTPATIENT
Start: 2023-11-14 | End: 2023-11-21

## 2024-02-05 ENCOUNTER — PATIENT MESSAGE (OUTPATIENT)
Dept: FAMILY MEDICINE | Facility: CLINIC | Age: 64
End: 2024-02-05

## 2024-02-05 ENCOUNTER — TELEPHONE (OUTPATIENT)
Dept: CARDIOLOGY | Facility: CLINIC | Age: 64
End: 2024-02-05
Payer: MEDICAID

## 2024-02-05 ENCOUNTER — OFFICE VISIT (OUTPATIENT)
Dept: FAMILY MEDICINE | Facility: CLINIC | Age: 64
End: 2024-02-05
Payer: MEDICAID

## 2024-02-05 VITALS
DIASTOLIC BLOOD PRESSURE: 71 MMHG | HEART RATE: 81 BPM | BODY MASS INDEX: 38.27 KG/M2 | HEIGHT: 63 IN | SYSTOLIC BLOOD PRESSURE: 137 MMHG | OXYGEN SATURATION: 97 % | WEIGHT: 216 LBS

## 2024-02-05 DIAGNOSIS — I35.0 AORTIC VALVE STENOSIS, ETIOLOGY OF CARDIAC VALVE DISEASE UNSPECIFIED: ICD-10-CM

## 2024-02-05 DIAGNOSIS — R05.3 PERSISTENT COUGH FOR 3 WEEKS OR LONGER: Primary | ICD-10-CM

## 2024-02-05 PROCEDURE — 3075F SYST BP GE 130 - 139MM HG: CPT | Mod: CPTII,S$GLB,, | Performed by: PHYSICIAN ASSISTANT

## 2024-02-05 PROCEDURE — 3078F DIAST BP <80 MM HG: CPT | Mod: CPTII,S$GLB,, | Performed by: PHYSICIAN ASSISTANT

## 2024-02-05 PROCEDURE — 3008F BODY MASS INDEX DOCD: CPT | Mod: CPTII,S$GLB,, | Performed by: PHYSICIAN ASSISTANT

## 2024-02-05 PROCEDURE — 1159F MED LIST DOCD IN RCRD: CPT | Mod: CPTII,S$GLB,, | Performed by: PHYSICIAN ASSISTANT

## 2024-02-05 PROCEDURE — 99214 OFFICE O/P EST MOD 30 MIN: CPT | Mod: S$GLB,,, | Performed by: PHYSICIAN ASSISTANT

## 2024-02-05 RX ORDER — IPRATROPIUM BROMIDE AND ALBUTEROL SULFATE 2.5; .5 MG/3ML; MG/3ML
3 SOLUTION RESPIRATORY (INHALATION) EVERY 6 HOURS PRN
Qty: 75 ML | Refills: 0 | Status: SHIPPED | OUTPATIENT
Start: 2024-02-05 | End: 2025-02-04

## 2024-02-05 RX ORDER — PREDNISONE 20 MG/1
20 TABLET ORAL 2 TIMES DAILY
Qty: 10 TABLET | Refills: 0 | Status: SHIPPED | OUTPATIENT
Start: 2024-02-05 | End: 2024-02-10

## 2024-02-05 NOTE — PROGRESS NOTES
SUBJECTIVE:    Patient ID: Noah Cast is a 63 y.o. female.    Chief Complaint: Follow-up (No bottles/ no refills needed/ pt states for 2 weeks coughing, bodyaches,fatigue and runny nose went to urgent care tested negative for flu, covid and strep today she is still coughing mostly at night and fatigue)    63-year-old female presents today for regular follow-up.  Does report for the past 2 weeks upper respiratory symptoms including cough, body aches, fatigue and runny nose.  Was seen and evaluated at urgent care and tested negative for flu COVID and strep.  Reports symptoms overall improved but still dealing with cough and production.  Cough worse at night and some fatigue.        Hospital Outpatient Visit on 11/02/2023   Component Date Value Ref Range Status    BSA 11/02/2023 2.15  m2 Final    LVOT stroke volume 11/02/2023 85.41  cm3 Final    LVIDd 11/02/2023 4.69  3.5 - 6.0 cm Final    LV Systolic Volume 11/02/2023 32.80  mL Final    LV Systolic Volume Index 11/02/2023 16.2  mL/m2 Final    LVIDs 11/02/2023 2.92  2.1 - 4.0 cm Final    LV Diastolic Volume 11/02/2023 102.00  mL Final    LV Diastolic Volume Index 11/02/2023 50.25  mL/m2 Final    IVS 11/02/2023 1.07  0.6 - 1.1 cm Final    LVOT diameter 11/02/2023 2.00  cm Final    LVOT area 11/02/2023 3.1  cm2 Final    FS 11/02/2023 38  28 - 44 % Final    Left Ventricle Relative Wall Thick* 11/02/2023 0.46  cm Final    Posterior Wall 11/02/2023 1.08  0.6 - 1.1 cm Final    LV mass 11/02/2023 181.02  g Final    LV Mass Index 11/02/2023 89  g/m2 Final    MV Peak E Tello 11/02/2023 0.58  m/s Final    TDI LATERAL 11/02/2023 0.08  m/s Final    TDI SEPTAL 11/02/2023 0.06  m/s Final    E/E' ratio 11/02/2023 8.29  m/s Final    MV Peak A Tello 11/02/2023 1.06  m/s Final    TR Max Tello 11/02/2023 2.41  m/s Final    E/A ratio 11/02/2023 0.55   Final    IVRT 11/02/2023 162.00  msec Final    E wave deceleration time 11/02/2023 194.00  msec Final    LV SEPTAL E/E' RATIO  11/02/2023 9.67  m/s Final    LV LATERAL E/E' RATIO 11/02/2023 7.25  m/s Final    LVOT peak nay 11/02/2023 1.29  m/s Final    Left Ventricular Outflow Tract Mili* 11/02/2023 0.88  cm/s Final    Left Ventricular Outflow Tract Mili* 11/02/2023 4.00  mmHg Final    RVDD 11/02/2023 2.19  cm Final    AV regurgitation pressure 1/2 time 11/02/2023 1,288  ms Final    AR Max Any 11/02/2023 2.17  m/s Final    AV mean gradient 11/02/2023 16  mmHg Final    AV peak gradient 11/02/2023 29  mmHg Final    Ao peak any 11/02/2023 2.69  m/s Final    Ao VTI 11/02/2023 50.40  cm Final    LVOT peak VTI 11/02/2023 27.20  cm Final    AV valve area 11/02/2023 1.69  cm² Final    AV Velocity Ratio 11/02/2023 0.48   Final    AV index (prosthetic) 11/02/2023 0.54   Final    SUSAN by Velocity Ratio 11/02/2023 1.51  cm² Final    MV stenosis pressure 1/2 time 11/02/2023 73.00  ms Final    MV valve area p 1/2 method 11/02/2023 3.01  cm2 Final    Triscuspid Valve Regurgitation Pea* 11/02/2023 23  mmHg Final    Ao root annulus 11/02/2023 2.70  cm Final    Mean e' 11/02/2023 0.07  m/s Final    ZLVIDS 11/02/2023 -1.85   Final    ZLVIDD 11/02/2023 -2.49   Final    AORTIC VALVE CUSP SEPERATION 11/02/2023 1.50  cm Final    TV resting pulmonary artery pressu* 11/02/2023 26  mmHg Final    RV TB RVSP 11/02/2023 5  mmHg Final    Est. RA pres 11/02/2023 3  mmHg Final   Office Visit on 10/30/2023   Component Date Value Ref Range Status    WBC 10/31/2023 4.8  3.8 - 10.8 Thousand/uL Final    RBC 10/31/2023 6.15 (H)  3.80 - 5.10 Million/uL Final    Hemoglobin 10/31/2023 12.3  11.7 - 15.5 g/dL Final    Hematocrit 10/31/2023 39.8  35.0 - 45.0 % Final    MCV 10/31/2023 64.7 (L)  80.0 - 100.0 fL Final    MCH 10/31/2023 20.0 (L)  27.0 - 33.0 pg Final    MCHC 10/31/2023 30.9 (L)  32.0 - 36.0 g/dL Final    RDW 10/31/2023 17.3 (H)  11.0 - 15.0 % Final    Platelets 10/31/2023 264  140 - 400 Thousand/uL Final    MPV 10/31/2023 9.0  7.5 - 12.5 fL Final    Neutrophils, Abs  10/31/2023 3,317  1,500 - 7,800 cells/uL Final    Lymph # 10/31/2023 1,013  850 - 3,900 cells/uL Final    Mono # 10/31/2023 302  200 - 950 cells/uL Final    Eos # 10/31/2023 139  15 - 500 cells/uL Final    Baso # 10/31/2023 29  0 - 200 cells/uL Final    Neutrophils Relative 10/31/2023 69.1  % Final    Lymph % 10/31/2023 21.1  % Final    Mono % 10/31/2023 6.3  % Final    Eosinophil % 10/31/2023 2.9  % Final    Basophil % 10/31/2023 0.6  % Final    CBC Morphology 10/31/2023   NORMAL Final    Glucose 10/31/2023 98  65 - 99 mg/dL Final    BUN 10/31/2023 20  7 - 25 mg/dL Final    Creatinine 10/31/2023 0.83  0.50 - 1.05 mg/dL Final    eGFR 10/31/2023 80  > OR = 60 mL/min/1.73m2 Final    BUN/Creatinine Ratio 10/31/2023 SEE NOTE:  6 - 22 (calc) Final    Sodium 10/31/2023 140  135 - 146 mmol/L Final    Potassium 10/31/2023 4.7  3.5 - 5.3 mmol/L Final    Chloride 10/31/2023 104  98 - 110 mmol/L Final    CO2 10/31/2023 29  20 - 32 mmol/L Final    Calcium 10/31/2023 9.5  8.6 - 10.4 mg/dL Final    Total Protein 10/31/2023 7.1  6.1 - 8.1 g/dL Final    Albumin 10/31/2023 4.5  3.6 - 5.1 g/dL Final    Globulin, Total 10/31/2023 2.6  1.9 - 3.7 g/dL (calc) Final    Albumin/Globulin Ratio 10/31/2023 1.7  1.0 - 2.5 (calc) Final    Total Bilirubin 10/31/2023 0.9  0.2 - 1.2 mg/dL Final    Alkaline Phosphatase 10/31/2023 47  37 - 153 U/L Final    AST 10/31/2023 10  10 - 35 U/L Final    ALT 10/31/2023 12  6 - 29 U/L Final    Cholesterol 10/31/2023 196  <200 mg/dL Final    HDL 10/31/2023 45 (L)  > OR = 50 mg/dL Final    Triglycerides 10/31/2023 165 (H)  <150 mg/dL Final    LDL Cholesterol 10/31/2023 123 (H)  mg/dL (calc) Final    HDL/Cholesterol Ratio 10/31/2023 4.4  <5.0 (calc) Final    Non HDL Chol. (LDL+VLDL) 10/31/2023 151 (H)  <130 mg/dL (calc) Final    TSH w/reflex to FT4 10/31/2023 2.15  0.40 - 4.50 mIU/L Final    Color, UA 10/31/2023 YELLOW  YELLOW Final    Appearance, UA 10/31/2023 CLEAR  CLEAR Final    Specific [place], UA  10/31/2023 1.023  1.001 - 1.035 Final    pH, UA 10/31/2023 5.5  5.0 - 8.0 Final    Glucose, UA 10/31/2023 NEGATIVE  NEGATIVE Final    Bilirubin, UA 10/31/2023 NEGATIVE  NEGATIVE Final    Ketones, UA 10/31/2023 NEGATIVE  NEGATIVE Final    Occult Blood UA 10/31/2023 NEGATIVE  NEGATIVE Final    Protein, UA 10/31/2023 NEGATIVE  NEGATIVE Final    Nitrite, UA 10/31/2023 NEGATIVE  NEGATIVE Final    Leukocytes, UA 10/31/2023 NEGATIVE  NEGATIVE Final    WBC Casts, UA 10/31/2023 NONE SEEN  < OR = 5 /HPF Final    RBC Casts, UA 10/31/2023 0-2  < OR = 2 /HPF Final    Squam Epithel, UA 10/31/2023 0-5  < OR = 5 /HPF Final    Bacteria, UA 10/31/2023 NONE SEEN  NONE SEEN /HPF Final    Hyaline Casts, UA 10/31/2023 NONE SEEN  NONE SEEN /LPF Final    Service Cmt: 10/31/2023    Final    Reflexive Urine Culture 10/31/2023    Final       No past medical history on file.  Past Surgical History:   Procedure Laterality Date     SECTION       No family history on file.    Marital Status:   Alcohol History:  reports no history of alcohol use.  Tobacco History:  reports that she has never smoked. She has never been exposed to tobacco smoke. She has never used smokeless tobacco.  Drug History:  reports no history of drug use.    Review of patient's allergies indicates:  No Known Allergies    Current Outpatient Medications:     furosemide (LASIX) 20 MG tablet, TK 1 T PO QD WITH POTASSIUM  RICH FOOD PRN, Disp: 90 tablet, Rfl: 1    rimegepant (NURTEC) 75 mg odt, Take 1 tablet (75 mg total) by mouth daily as needed for Migraine. Place ODT tablet on the tongue; alternatively the ODT tablet may be placed under the tongue, Disp: 12 tablet, Rfl: 3    triamcinolone acetonide 0.1% (KENALOG) 0.1 % cream, Apply topically 2 (two) times daily., Disp: 45 g, Rfl: 1    albuterol-ipratropium (DUO-NEB) 2.5 mg-0.5 mg/3 mL nebulizer solution, Take 3 mLs by nebulization every 6 (six) hours as needed for Wheezing. Rescue, Disp: 75 mL, Rfl: 0     "butalbital-acetaminophen-caffeine -40 mg (FIORICET, ESGIC) -40 mg per tablet, Take 1 tablet by mouth 2 (two) times daily as needed., Disp: , Rfl:     predniSONE (DELTASONE) 20 MG tablet, Take 1 tablet (20 mg total) by mouth 2 (two) times daily. for 5 days, Disp: 10 tablet, Rfl: 0    RESTASIS 0.05 % ophthalmic emulsion, , Disp: , Rfl:     rizatriptan (MAXALT-MLT) 10 MG disintegrating tablet, Take 1 tablet (10 mg total) by mouth as needed for Migraine. May repeat in 1 hours if needed (Patient not taking: Reported on 10/30/2023), Disp: 12 tablet, Rfl: 2    Review of Systems   Constitutional:  Positive for fatigue. Negative for chills and fever.   HENT:  Positive for congestion. Negative for ear discharge, ear pain, rhinorrhea, sinus pressure, sinus pain, sneezing, sore throat and trouble swallowing.    Eyes:  Negative for pain, discharge, redness and itching.   Respiratory:  Positive for cough. Negative for chest tightness and shortness of breath.    Cardiovascular:  Negative for chest pain.   Gastrointestinal:  Negative for abdominal distention and abdominal pain.   Neurological:  Negative for weakness and headaches.          Objective:      Vitals:    02/05/24 0848   BP: 137/71   Pulse: 81   SpO2: 97%   Weight: 98 kg (216 lb)   Height: 5' 2.5" (1.588 m)     Physical Exam  Constitutional:       General: She is not in acute distress.     Appearance: She is well-developed.   HENT:      Head: Normocephalic and atraumatic.   Eyes:      General:         Right eye: No discharge.         Left eye: No discharge.      Conjunctiva/sclera: Conjunctivae normal.      Pupils: Pupils are equal, round, and reactive to light.   Neck:      Thyroid: No thyromegaly.   Cardiovascular:      Rate and Rhythm: Normal rate and regular rhythm.      Heart sounds: Murmur (systolic 3/6 radiates to carotids. STAR border) heard.   Pulmonary:      Effort: Pulmonary effort is normal. No respiratory distress.      Breath sounds: Normal " breath sounds. No wheezing.   Chest:      Chest wall: No tenderness.   Abdominal:      General: Bowel sounds are normal.      Palpations: Abdomen is soft.   Musculoskeletal:      Cervical back: Normal range of motion and neck supple.           Assessment:       1. Persistent cough for 3 weeks or longer    2. Aortic valve stenosis, etiology of cardiac valve disease unspecified         Plan:       Persistent cough for 3 weeks or longer  Comments:  check labs now. CXR reported to be normal last month. will add nebulizer and short course of steroid. May need to consider pulm/ENT if sxs persist  Orders:  -     CBC W/ AUTO DIFFERENTIAL; Future; Expected date: 02/05/2024  -     Comprehensive Metabolic Panel; Future; Expected date: 02/05/2024  -     Lipid Panel; Future; Expected date: 02/05/2024  -     TSH; Future; Expected date: 02/05/2024  -     NEBULIZER FOR HOME USE  -     albuterol-ipratropium (DUO-NEB) 2.5 mg-0.5 mg/3 mL nebulizer solution; Take 3 mLs by nebulization every 6 (six) hours as needed for Wheezing. Rescue  Dispense: 75 mL; Refill: 0  -     predniSONE (DELTASONE) 20 MG tablet; Take 1 tablet (20 mg total) by mouth 2 (two) times daily. for 5 days  Dispense: 10 tablet; Refill: 0    Aortic valve stenosis, etiology of cardiac valve disease unspecified  Comments:  moderate. echo reviewed. will be seeing cards asap. discussed that this may be causing some of the fatigue.      Follow up in about 3 months (around 5/5/2024).        2/5/2024 Leonel Nino PA-C

## 2024-02-05 NOTE — TELEPHONE ENCOUNTER
----- Message from Sophie Espinosa sent at 2/5/2024 10:58 AM CST -----  Regarding: Pt called to reschedule her canceled appt and would like a call back today to r/s for a Heart murmur  Appointment Access Request:    Pt called to reschedule her canceled appt and would like a call back today to r/s for a heart murmur    Pt can be reached at  [phone]

## 2024-02-06 ENCOUNTER — PATIENT MESSAGE (OUTPATIENT)
Dept: CARDIOLOGY | Facility: CLINIC | Age: 64
End: 2024-02-06
Payer: MEDICAID

## 2024-02-06 LAB
ALBUMIN SERPL-MCNC: 4.3 G/DL (ref 3.6–5.1)
ALBUMIN/GLOB SERPL: 1.7 (CALC) (ref 1–2.5)
ALP SERPL-CCNC: 37 U/L (ref 37–153)
ALT SERPL-CCNC: 9 U/L (ref 6–29)
AST SERPL-CCNC: 10 U/L (ref 10–35)
BASOPHILS # BLD AUTO: 19 CELLS/UL (ref 0–200)
BASOPHILS NFR BLD AUTO: 0.5 %
BILIRUB SERPL-MCNC: 0.9 MG/DL (ref 0.2–1.2)
BUN SERPL-MCNC: 18 MG/DL (ref 7–25)
BUN/CREAT SERPL: NORMAL (CALC) (ref 6–22)
CALCIUM SERPL-MCNC: 9.3 MG/DL (ref 8.6–10.4)
CHLORIDE SERPL-SCNC: 108 MMOL/L (ref 98–110)
CHOLEST SERPL-MCNC: 157 MG/DL
CHOLEST/HDLC SERPL: 4.5 (CALC)
CO2 SERPL-SCNC: 24 MMOL/L (ref 20–32)
CREAT SERPL-MCNC: 0.65 MG/DL (ref 0.5–1.05)
EGFR: 99 ML/MIN/1.73M2
EOSINOPHIL # BLD AUTO: 141 CELLS/UL (ref 15–500)
EOSINOPHIL NFR BLD AUTO: 3.7 %
ERYTHROCYTE [DISTWIDTH] IN BLOOD BY AUTOMATED COUNT: 17.9 % (ref 11–15)
GLOBULIN SER CALC-MCNC: 2.6 G/DL (CALC) (ref 1.9–3.7)
GLUCOSE SERPL-MCNC: 96 MG/DL (ref 65–99)
HCT VFR BLD AUTO: 37.9 % (ref 35–45)
HDLC SERPL-MCNC: 35 MG/DL
HGB BLD-MCNC: 11.1 G/DL (ref 11.7–15.5)
LDLC SERPL CALC-MCNC: 99 MG/DL (CALC)
LYMPHOCYTES # BLD AUTO: 825 CELLS/UL (ref 850–3900)
LYMPHOCYTES NFR BLD AUTO: 21.7 %
MCH RBC QN AUTO: 19.3 PG (ref 27–33)
MCHC RBC AUTO-ENTMCNC: 29.3 G/DL (ref 32–36)
MCV RBC AUTO: 65.9 FL (ref 80–100)
MONOCYTES # BLD AUTO: 239 CELLS/UL (ref 200–950)
MONOCYTES NFR BLD AUTO: 6.3 %
NEUTROPHILS # BLD AUTO: 2576 CELLS/UL (ref 1500–7800)
NEUTROPHILS NFR BLD AUTO: 67.8 %
NONHDLC SERPL-MCNC: 122 MG/DL (CALC)
PLATELET # BLD AUTO: 273 THOUSAND/UL (ref 140–400)
PMV BLD REES-ECKER: 9.5 FL (ref 7.5–12.5)
POTASSIUM SERPL-SCNC: 4.3 MMOL/L (ref 3.5–5.3)
PROT SERPL-MCNC: 6.9 G/DL (ref 6.1–8.1)
RBC # BLD AUTO: 5.75 MILLION/UL (ref 3.8–5.1)
SERVICE CMNT-IMP: ABNORMAL
SODIUM SERPL-SCNC: 142 MMOL/L (ref 135–146)
TRIGL SERPL-MCNC: 129 MG/DL
TSH SERPL-ACNC: 1.02 MIU/L (ref 0.4–4.5)
WBC # BLD AUTO: 3.8 THOUSAND/UL (ref 3.8–10.8)

## 2024-02-14 ENCOUNTER — PATIENT MESSAGE (OUTPATIENT)
Dept: FAMILY MEDICINE | Facility: CLINIC | Age: 64
End: 2024-02-14
Payer: MEDICAID

## 2024-02-14 ENCOUNTER — TELEPHONE (OUTPATIENT)
Dept: FAMILY MEDICINE | Facility: CLINIC | Age: 64
End: 2024-02-14
Payer: MEDICAID

## 2024-03-06 ENCOUNTER — PATIENT MESSAGE (OUTPATIENT)
Dept: FAMILY MEDICINE | Facility: CLINIC | Age: 64
End: 2024-03-06
Payer: MEDICAID

## 2024-03-07 ENCOUNTER — TELEPHONE (OUTPATIENT)
Dept: FAMILY MEDICINE | Facility: CLINIC | Age: 64
End: 2024-03-07
Payer: MEDICAID

## 2024-03-07 DIAGNOSIS — J41.0 SIMPLE CHRONIC BRONCHITIS: Primary | ICD-10-CM

## 2024-03-07 DIAGNOSIS — R06.02 SHORTNESS OF BREATH: ICD-10-CM

## 2024-03-07 NOTE — TELEPHONE ENCOUNTER
----- Message from Yuki Lee sent at 3/7/2024  2:23 PM CST -----  Regarding: Nebulizer  Contact: [phone]  Good afternoon! Patient does not qualify for a Nebulizer with her current Dx, but Mrs Cast stated that she has Bronchitis. Please update her Rx with a valid Dx. Thanks! Khanh

## 2024-05-16 ENCOUNTER — OFFICE VISIT (OUTPATIENT)
Dept: FAMILY MEDICINE | Facility: CLINIC | Age: 64
End: 2024-05-16
Payer: MEDICAID

## 2024-05-16 VITALS
HEIGHT: 62 IN | BODY MASS INDEX: 41.22 KG/M2 | HEART RATE: 83 BPM | WEIGHT: 224 LBS | DIASTOLIC BLOOD PRESSURE: 83 MMHG | SYSTOLIC BLOOD PRESSURE: 130 MMHG | OXYGEN SATURATION: 96 % | RESPIRATION RATE: 18 BRPM

## 2024-05-16 DIAGNOSIS — R06.02 SHORTNESS OF BREATH: ICD-10-CM

## 2024-05-16 DIAGNOSIS — R60.0 EDEMA OF BOTH LEGS: ICD-10-CM

## 2024-05-16 DIAGNOSIS — E66.01 CLASS 3 SEVERE OBESITY WITH BODY MASS INDEX (BMI) OF 40.0 TO 44.9 IN ADULT, UNSPECIFIED OBESITY TYPE, UNSPECIFIED WHETHER SERIOUS COMORBIDITY PRESENT: ICD-10-CM

## 2024-05-16 DIAGNOSIS — I35.0 MODERATE AORTIC STENOSIS: Primary | ICD-10-CM

## 2024-05-16 DIAGNOSIS — G43.119 INTRACTABLE MIGRAINE WITH AURA WITHOUT STATUS MIGRAINOSUS: ICD-10-CM

## 2024-05-16 DIAGNOSIS — L40.9 PSORIASIS: ICD-10-CM

## 2024-05-16 DIAGNOSIS — R01.1 HEART MURMUR: ICD-10-CM

## 2024-05-16 PROCEDURE — 99214 OFFICE O/P EST MOD 30 MIN: CPT | Mod: S$GLB,,, | Performed by: PHYSICIAN ASSISTANT

## 2024-05-16 PROCEDURE — 3079F DIAST BP 80-89 MM HG: CPT | Mod: CPTII,S$GLB,, | Performed by: PHYSICIAN ASSISTANT

## 2024-05-16 PROCEDURE — 1159F MED LIST DOCD IN RCRD: CPT | Mod: CPTII,S$GLB,, | Performed by: PHYSICIAN ASSISTANT

## 2024-05-16 PROCEDURE — 3008F BODY MASS INDEX DOCD: CPT | Mod: CPTII,S$GLB,, | Performed by: PHYSICIAN ASSISTANT

## 2024-05-16 PROCEDURE — 3075F SYST BP GE 130 - 139MM HG: CPT | Mod: CPTII,S$GLB,, | Performed by: PHYSICIAN ASSISTANT

## 2024-05-16 RX ORDER — FUROSEMIDE 20 MG/1
TABLET ORAL
Qty: 90 TABLET | Refills: 1 | Status: SHIPPED | OUTPATIENT
Start: 2024-05-16

## 2024-05-16 RX ORDER — CLOBETASOL PROPIONATE 0.5 MG/G
OINTMENT TOPICAL 2 TIMES DAILY
Qty: 30 G | Refills: 0 | Status: SHIPPED | OUTPATIENT
Start: 2024-05-16 | End: 2024-06-15

## 2024-05-16 RX ORDER — RIMEGEPANT SULFATE 75 MG/75MG
75 TABLET, ORALLY DISINTEGRATING ORAL DAILY PRN
Qty: 12 TABLET | Refills: 3 | Status: SHIPPED | OUTPATIENT
Start: 2024-05-16

## 2024-05-16 NOTE — PROGRESS NOTES
SUBJECTIVE:    Patient ID: Noah Cast is a 63 y.o. female.    Chief Complaint: Follow-up (No bottles// refills needed//pt states she have no complaints today )    63 year old female presents for a follow up. She has no complaints today. States her migraines have been worse than normal which she says may be due to stress levels. She saw Dr. Fair as her cardiologist. She also has bilateral leg tenderness with light touch that she noticed when getting a pedicure. Darkened plaque annular lesions noted to the knees bilaterally.    Follow-up  Associated symptoms include headaches, neck pain and a rash. Pertinent negatives include no arthralgias, chest pain, joint swelling, vomiting or weakness.       Office Visit on 02/05/2024   Component Date Value Ref Range Status    WBC 02/05/2024 3.8  3.8 - 10.8 Thousand/uL Final    RBC 02/05/2024 5.75 (H)  3.80 - 5.10 Million/uL Final    Hemoglobin 02/05/2024 11.1 (L)  11.7 - 15.5 g/dL Final    Hematocrit 02/05/2024 37.9  35.0 - 45.0 % Final    MCV 02/05/2024 65.9 (L)  80.0 - 100.0 fL Final    MCH 02/05/2024 19.3 (L)  27.0 - 33.0 pg Final    MCHC 02/05/2024 29.3 (L)  32.0 - 36.0 g/dL Final    RDW 02/05/2024 17.9 (H)  11.0 - 15.0 % Final    Platelets 02/05/2024 273  140 - 400 Thousand/uL Final    MPV 02/05/2024 9.5  7.5 - 12.5 fL Final    Neutrophils, Abs 02/05/2024 2,576  1,500 - 7,800 cells/uL Final    Lymph # 02/05/2024 825 (L)  850 - 3,900 cells/uL Final    Mono # 02/05/2024 239  200 - 950 cells/uL Final    Eos # 02/05/2024 141  15 - 500 cells/uL Final    Baso # 02/05/2024 19  0 - 200 cells/uL Final    Neutrophils Relative 02/05/2024 67.8  % Final    Lymph % 02/05/2024 21.7  % Final    Mono % 02/05/2024 6.3  % Final    Eosinophil % 02/05/2024 3.7  % Final    Basophil % 02/05/2024 0.5  % Final    Differential Comment 02/05/2024 Review of peripheral smear confirms automated results.   Final    Glucose 02/05/2024 96  65 - 99 mg/dL Final    BUN 02/05/2024 18  7 - 25  mg/dL Final    Creatinine 2024 0.65  0.50 - 1.05 mg/dL Final    eGFR 2024 99  > OR = 60 mL/min/1.73m2 Final    BUN/Creatinine Ratio 2024 SEE NOTE:  6 - 22 (calc) Final    Sodium 2024 142  135 - 146 mmol/L Final    Potassium 2024 4.3  3.5 - 5.3 mmol/L Final    Chloride 2024 108  98 - 110 mmol/L Final    CO2 2024 24  20 - 32 mmol/L Final    Calcium 2024 9.3  8.6 - 10.4 mg/dL Final    Total Protein 2024 6.9  6.1 - 8.1 g/dL Final    Albumin 2024 4.3  3.6 - 5.1 g/dL Final    Globulin, Total 2024 2.6  1.9 - 3.7 g/dL (calc) Final    Albumin/Globulin Ratio 2024 1.7  1.0 - 2.5 (calc) Final    Total Bilirubin 2024 0.9  0.2 - 1.2 mg/dL Final    Alkaline Phosphatase 2024 37  37 - 153 U/L Final    AST 2024 10  10 - 35 U/L Final    ALT 2024 9  6 - 29 U/L Final    Cholesterol 2024 157  <200 mg/dL Final    HDL 2024 35 (L)  > OR = 50 mg/dL Final    Triglycerides 2024 129  <150 mg/dL Final    LDL Cholesterol 2024 99  mg/dL (calc) Final    HDL/Cholesterol Ratio 2024 4.5  <5.0 (calc) Final    Non HDL Chol. (LDL+VLDL) 2024 122  <130 mg/dL (calc) Final    TSH 2024 1.02  0.40 - 4.50 mIU/L Final       History reviewed. No pertinent past medical history.  Past Surgical History:   Procedure Laterality Date     SECTION       No family history on file.    Marital Status:   Alcohol History:  reports no history of alcohol use.  Tobacco History:  reports that she has never smoked. She has never been exposed to tobacco smoke. She has never used smokeless tobacco.  Drug History:  reports no history of drug use.    Review of patient's allergies indicates:  No Known Allergies    Current Outpatient Medications:     albuterol-ipratropium (DUO-NEB) 2.5 mg-0.5 mg/3 mL nebulizer solution, Take 3 mLs by nebulization every 6 (six) hours as needed for Wheezing. Rescue, Disp: 75 mL, Rfl: 0     "butalbital-acetaminophen-caffeine -40 mg (FIORICET, ESGIC) -40 mg per tablet, Take 1 tablet by mouth 2 (two) times daily as needed., Disp: , Rfl:     RESTASIS 0.05 % ophthalmic emulsion, , Disp: , Rfl:     triamcinolone acetonide 0.1% (KENALOG) 0.1 % cream, Apply topically 2 (two) times daily., Disp: 45 g, Rfl: 1    clobetasol 0.05% (TEMOVATE) 0.05 % Oint, Apply topically 2 (two) times daily., Disp: 30 g, Rfl: 0    furosemide (LASIX) 20 MG tablet, TK 1 T PO QD WITH POTASSIUM  RICH FOOD PRN, Disp: 90 tablet, Rfl: 1    rimegepant (NURTEC) 75 mg odt, Take 1 tablet (75 mg total) by mouth daily as needed for Migraine. Place ODT tablet on the tongue; alternatively the ODT tablet may be placed under the tongue, Disp: 12 tablet, Rfl: 3    Review of Systems   Constitutional:  Negative for activity change and unexpected weight change.   HENT:  Negative for hearing loss, rhinorrhea and trouble swallowing.    Eyes:  Negative for discharge and visual disturbance.   Respiratory:  Negative for chest tightness and wheezing.    Cardiovascular:  Negative for chest pain and palpitations.   Gastrointestinal:  Negative for blood in stool, constipation, diarrhea and vomiting.   Endocrine: Negative for polydipsia and polyuria.   Genitourinary:  Negative for difficulty urinating, dysuria, hematuria and menstrual problem.   Musculoskeletal:  Positive for neck pain. Negative for arthralgias and joint swelling.   Skin:  Positive for rash.        Bilateral plaque lesions noted to the knees.    Neurological:  Positive for headaches. Negative for weakness.   Psychiatric/Behavioral:  Negative for confusion and dysphoric mood.           Objective:      Vitals:    05/16/24 0735   BP: 130/83   Pulse: 83   Resp: 18   SpO2: 96%   Weight: 101.6 kg (224 lb)   Height: 5' 2" (1.575 m)     Physical Exam  Constitutional:       Appearance: Normal appearance.   HENT:      Head: Normocephalic and atraumatic.   Cardiovascular:      Rate and Rhythm: " Normal rate and regular rhythm.      Pulses: Normal pulses.      Heart sounds: Murmur heard.   Pulmonary:      Effort: Pulmonary effort is normal.      Breath sounds: Normal breath sounds.   Musculoskeletal:         General: Normal range of motion.      Cervical back: Normal range of motion and neck supple.   Skin:     General: Skin is warm and dry.   Neurological:      General: No focal deficit present.      Mental Status: She is alert and oriented to person, place, and time. Mental status is at baseline.   Psychiatric:         Mood and Affect: Mood normal.         Behavior: Behavior normal.         Thought Content: Thought content normal.         Judgment: Judgment normal.           Assessment:       1. Moderate aortic stenosis    2. Intractable migraine with aura without status migrainosus    3. Edema of both legs    4. Shortness of breath    5. Heart murmur    6. Psoriasis    7. Class 3 severe obesity with body mass index (BMI) of 40.0 to 44.9 in adult, unspecified obesity type, unspecified whether serious comorbidity present           Plan:       Moderate aortic stenosis  Comments:  confirmed by echo. saw cards. will plan to recheck in one year. will see cards again if progressing.    Intractable migraine with aura without status migrainosus  Comments:  refills of nurtec as prescribed. more effective for her than ubrelvy.  Orders:  -     rimegepant (NURTEC) 75 mg odt; Take 1 tablet (75 mg total) by mouth daily as needed for Migraine. Place ODT tablet on the tongue; alternatively the ODT tablet may be placed under the tongue  Dispense: 12 tablet; Refill: 3    Edema of both legs  Comments:  Lasix PRN for edema, refills today  Orders:  -     furosemide (LASIX) 20 MG tablet; TK 1 T PO QD WITH POTASSIUM  RICH FOOD PRN  Dispense: 90 tablet; Refill: 1    Shortness of breath  Comments:  worsening over the past several months, murmur appreciated. would like to check echo for further eval.  Orders:  -     furosemide  (LASIX) 20 MG tablet; TK 1 T PO QD WITH POTASSIUM  RICH FOOD PRN  Dispense: 90 tablet; Refill: 1    Heart murmur  Comments:  will await echo and cards referral.  Orders:  -     furosemide (LASIX) 20 MG tablet; TK 1 T PO QD WITH POTASSIUM  RICH FOOD PRN  Dispense: 90 tablet; Refill: 1    Psoriasis  Comments:  trial with clobetasol and will see how she does. consider derm referral if needed.  Orders:  -     clobetasol 0.05% (TEMOVATE) 0.05 % Oint; Apply topically 2 (two) times daily.  Dispense: 30 g; Refill: 0    Class 3 severe obesity with body mass index (BMI) of 40.0 to 44.9 in adult, unspecified obesity type, unspecified whether serious comorbidity present  -     Ambulatory referral/consult to Bariatric Surgery; Future; Expected date: 05/16/2024      Problem List Items Addressed This Visit    None  Visit Diagnoses       Moderate aortic stenosis    -  Primary    confirmed by echo. saw cards. will plan to recheck in one year. will see cards again if progressing.    Intractable migraine with aura without status migrainosus        refills of nurtec as prescribed. more effective for her than ubrelvy.    Relevant Medications    rimegepant (NURTEC) 75 mg odt    Edema of both legs        Lasix PRN for edema, refills today    Relevant Medications    furosemide (LASIX) 20 MG tablet    Shortness of breath        worsening over the past several months, murmur appreciated. would like to check echo for further eval.    Relevant Medications    furosemide (LASIX) 20 MG tablet    Heart murmur        will await echo and cards referral.    Relevant Medications    furosemide (LASIX) 20 MG tablet    Psoriasis        trial with clobetasol and will see how she does. consider derm referral if needed.    Relevant Medications    clobetasol 0.05% (TEMOVATE) 0.05 % Oint    Class 3 severe obesity with body mass index (BMI) of 40.0 to 44.9 in adult, unspecified obesity type, unspecified whether serious comorbidity present        Relevant  Orders    Ambulatory referral/consult to Bariatric Surgery          Follow up in about 6 months (around 11/16/2024).        5/16/2024 Leonel Nino PA-C

## 2024-08-14 ENCOUNTER — TELEPHONE (OUTPATIENT)
Dept: BARIATRICS | Facility: CLINIC | Age: 64
End: 2024-08-14
Payer: MEDICAID

## 2024-08-14 NOTE — TELEPHONE ENCOUNTER
----- Message from Naomi Sifuentes sent at 8/14/2024  4:44 PM CDT -----  Regarding: appt  Type:  Sooner Apoointment Request      Name of Caller:pt    When is the first available appointment?dept booked     Symptoms:Class 3 severe obesity with body mass index (BMI) of 40.0 to 44.9 in adult, unspecified obesity type, unspecified whether serious comorbidity present.      Best Call Back Number:[phone]      Additional Information: pt has a referral on chart ready to be schedule.  please call to discuss.

## 2024-08-14 NOTE — TELEPHONE ENCOUNTER
called pt and explained we don't accept her insurance at this time for the Bariatric program. Also explained we don't have a doctor on the [facility] at this time either. Gave her [facility] # and [facility] #

## 2024-10-21 DIAGNOSIS — R05.3 PERSISTENT COUGH FOR 3 WEEKS OR LONGER: ICD-10-CM

## 2024-10-21 DIAGNOSIS — G43.119 INTRACTABLE MIGRAINE WITH AURA WITHOUT STATUS MIGRAINOSUS: ICD-10-CM

## 2024-10-22 ENCOUNTER — HOSPITAL ENCOUNTER (OUTPATIENT)
Dept: RADIOLOGY | Facility: HOSPITAL | Age: 64
Discharge: HOME | End: 2024-10-22
Attending: PHYSICIAN ASSISTANT
Payer: MEDICAID

## 2024-10-22 VITALS — BODY MASS INDEX: 41.22 KG/M2 | HEIGHT: 62 IN | WEIGHT: 224 LBS

## 2024-10-22 DIAGNOSIS — Z12.31 OTHER SCREENING MAMMOGRAM: ICD-10-CM

## 2024-10-22 PROCEDURE — 77063 BREAST TOMOSYNTHESIS BI: CPT | Mod: 26,,, | Performed by: RADIOLOGY

## 2024-10-22 PROCEDURE — 77067 SCR MAMMO BI INCL CAD: CPT | Mod: 26,,, | Performed by: RADIOLOGY

## 2024-10-22 PROCEDURE — 77067 SCR MAMMO BI INCL CAD: CPT | Mod: TC,PO

## 2024-10-22 RX ORDER — IPRATROPIUM BROMIDE AND ALBUTEROL SULFATE 2.5; .5 MG/3ML; MG/3ML
3 SOLUTION RESPIRATORY (INHALATION) EVERY 6 HOURS PRN
Qty: 75 ML | Refills: 0 | Status: SHIPPED | OUTPATIENT
Start: 2024-10-22 | End: 2025-10-22

## 2024-10-22 RX ORDER — RIMEGEPANT SULFATE 75 MG/75MG
75 TABLET, ORALLY DISINTEGRATING ORAL DAILY PRN
Qty: 12 TABLET | Refills: 3 | Status: SHIPPED | OUTPATIENT
Start: 2024-10-22

## 2025-04-09 ENCOUNTER — PATIENT MESSAGE (OUTPATIENT)
Dept: FAMILY MEDICINE | Facility: CLINIC | Age: 65
End: 2025-04-09
Payer: MEDICAID

## 2025-08-20 ENCOUNTER — PATIENT MESSAGE (OUTPATIENT)
Dept: ADMINISTRATIVE | Facility: HOSPITAL | Age: 65
End: 2025-08-20
Payer: MEDICAID